# Patient Record
Sex: FEMALE | Race: WHITE | Employment: UNEMPLOYED | URBAN - METROPOLITAN AREA
[De-identification: names, ages, dates, MRNs, and addresses within clinical notes are randomized per-mention and may not be internally consistent; named-entity substitution may affect disease eponyms.]

---

## 2017-08-09 PROBLEM — M16.12 OSTEOARTHRITIS OF LEFT HIP: Chronic | Status: ACTIVE | Noted: 2017-08-09

## 2017-08-09 NOTE — H&P
[address] [facility] [address]  History and Physical Exam    Patient: Xavi Tong MRN: [id number]  SSN: [id number]    YOB: 1955  Age: 65 y.o. Sex: female      Subjective:      Chief Complaint: Left hip pain    History of Present Illness:  Patient complains of left hip pain and difficulty ambulating, which has progressed over the past several months. X-rays showed osteoarthritis of the joint. The patient's pain has persisted and progressed despite conservative treatments and therapies. The patient has been previously treated with narcotic pain medications. The patient has at this time opted for surgical intervention. Past Medical History:   Diagnosis Date    Anxiety disorder     Arthritis     Asthma     Attention deficit disorder (ADD)     Chronic pain     lumbar and cervical spine and joints    DDD (degenerative disc disease), cervical     DDD (degenerative disc disease), lumbar     Depression     major depression and anxiety, PTSD    Fatty liver disease, nonalcoholic     Fibromyalgia     GERD (gastroesophageal reflux disease)     Headache     Hypertension     Ill-defined condition     Migraine headache     Neuralgia     secondary to shingles    Osteoarthritis of left hip 8/9/2017    Positive NICOLE (antinuclear antibody)     Post-menopausal     Premature ventricular contractions     occassional    Vitamin B12 deficiency     Vitamin D deficiency      Past Surgical History:   Procedure Laterality Date    ENDOSCOPY, COLON, DIAGNOSTIC       Social History     Occupational History    Not on file.      Social History Main Topics    Smoking status: Never Smoker    Smokeless tobacco: Never Used    Alcohol use 1.8 oz/week     1 Glasses of wine, 1 Cans of beer, 1 Shots of liquor per week      Comment: No alcoholic bevearges x 1 month    Drug use: No    Sexual activity: Yes     Prior to Admission medications    Medication Sig Start Date End Date Taking? Authorizing Provider   losartan (COZAAR) 100 mg tablet Take 100 mg by mouth daily. Historical Provider   vortioxetine (TRINTELLIX) 10 mg tablet Take 30 mg by mouth daily. Historical Provider   mirtazapine (REMERON) 45 mg tablet Take 90 mg by mouth nightly. Historical Provider   morphine CR (MS CONTIN) 100 mg CR tablet Take 100 mg by mouth. [id number]    Historical Provider   methylphenidate (RITALIN) 20 mg tablet Take 20 mg by mouth four (4) times daily. Historical Provider   oxyCODONE IR (OXY-IR) 30 mg immediate release tablet Take 30 mg by mouth four (4) times daily. [id number]    Historical Provider   celecoxib (CELEBREX) 100 mg capsule Take 100 mg by mouth two (2) times daily as needed for Pain. Historical Provider   famotidine (PEPCID) 40 mg tablet Take 40 mg by mouth daily as needed. Historical Provider   eszopiclone (LUNESTA) 2 mg tablet Take 2 mg by mouth nightly as needed for Sleep. Historical Provider   albuterol sulfate (PROVENTIL;VENTOLIN) 2.5 mg/0.5 mL nebu nebulizer solution 2.5 mg by Nebulization route every four (4) hours as needed for Wheezing. Historical Provider   multivitamin (ONE A DAY) tablet Take 2 Tabs by mouth daily. Historical Provider   MAGNESIUM CITRATE PO Take 250 mg by mouth daily. Historical Provider   ascorbic acid, vitamin C, (VITAMIN C) 500 mg tablet Take 1,000 mg by mouth two (2) times a day. Historical Provider   aspirin (ASPIRIN) 325 mg tablet Take 325 mg by mouth daily. Historical Provider   lubiPROStone (AMITIZA) 24 mcg capsule Take 24 mcg by mouth two (2) times daily as needed for Constipation. Historical Provider   co-enzyme Q-10 (CO Q-10) 100 mg capsule Take 100 mg by mouth daily. Historical Provider   DULCOLAX, BISACODYL, PO Take 1 Cap by mouth daily as needed. 1-3 tablets    Historical Provider   acetaminophen (TYLENOL EXTRA STRENGTH) 500 mg tablet Take 1,000 mg by mouth every six (6) hours as needed for Pain. Historical Provider   NALOXONE HCL (NARCAN IJ) 0.4 mg by SubCUTAneous route as needed. Historical Provider   lidocaine (LIDODERM) 5 % 1 Patch by TransDERmal route every twelve (12) hours as needed. Apply patch to the affected area for 12 hours a day and remove for 12 hours a day. Historical Provider   oxyCODONE-acetaminophen (PERCOCET)  mg per tablet Take 1 Tab by mouth every six (6) hours as needed for Pain. 5/1/14   Vincent Malagon MD   estradiol (ESTRACE) 2 mg tablet Take 2 mg by mouth nightly. Historical Provider   progesterone (PROMETRIUM) 200 mg capsule Take 200 mg by mouth nightly. Historical Provider   OnabotulinumtoxinA (BOTOX) 200 unit SolR 100 Units by Injection route once as needed for 1 dose. 8/13/13   Vincent Malagon MD   HYDROcodone-ibuprofen (VICOPROFEN) 7.5-200 mg per tablet Take 1 Tab by mouth two (2) times daily as needed for Pain for 30 days. FILL ON OR AFTER:  10/31/12  Indications: PAIN 10/31/12 11/30/12  Grey Clemons MD   HYDROcodone-ibuprofen (VICOPROFEN) 7.5-200 mg per tablet Take 1 Tab by mouth four (4) times daily as needed for Pain for 10 days. 10/23/12 11/2/12  Grey Clemons MD   atenolol (TENORMIN) 100 mg tablet Take 100 mg by mouth nightly. Historical Provider   hydrochlorothiazide (HYDRODIURIL) 25 mg tablet Take 25 mg by mouth daily. Historical Provider   ALPRAZolam Jessica Inches) 0.5 mg tablet Take 1 mg by mouth four (4) times daily as needed. To 1 mg     Historical Provider   cyanocobalamin (VITAMIN B12) 1,000 mcg/mL injection 10,000 mcg by IntraMUSCular route every thirty (30) days. Historical Provider   Calcium-Cholecalciferol, D3, (CALTRATE 600 + D) 600 mg(1,500mg) -400 unit Chew Take 1 Tab by mouth daily. Historical Provider   pseudoephedrine (SUDAFED) 30 mg tablet Take 60 mg by mouth every four (4) hours as needed.       Historical Provider   albuterol (PROVENTIL HFA) 90 mcg/actuation inhaler Take 2 Puffs by inhalation every six (6) hours as needed. Historical Provider   fluticasone-salmeterol (ADVAIR DISKUS) 250-50 mcg/dose diskus inhaler Take 1 Puff by inhalation every twelve (12) hours. Historical Provider   valACYclovir (VALTREX) 500 mg tablet Take  by mouth two (2) times daily as needed. Historical Provider   oxymorphone (OPANA ER) 20 mg ER tablet Take 1 Tab by mouth every twelve (12) hours for 30 days. For chronic pain  Fill on or after:  9/5/12  Indications: CHRONIC PAIN WITH OPIOID TOLERANCE, SEVERE PAIN WITH OPIOID TOLERANCE 9/5/12 10/5/12  Allison Sullivan MD   oxymorphone (OPANA ER) 20 mg ER tablet Take 1 Tab by mouth every twelve (12) hours for 30 days. For chronic pain  Fill on or after:  01/3/12  Indications: CHRONIC PAIN WITH OPIOID TOLERANCE, SEVERE PAIN WITH OPIOID TOLERANCE 10/3/12 11/2/12  Allison Sullivan MD   rizatriptan (MAXALT-MLT) 10 mg disintegrating tablet Take 1 Tab by mouth once as needed for Migraine for 90 days. Indications: MIGRAINE 9/5/12 12/4/12  Allison Sullivan MD       Allergies: Allergies   Allergen Reactions    Other Food Rash and Itching     Black beans    Cymbalta [Duloxetine] Other (comments)     Tachycardia, fever, H/A and HTN    Egg Itching    Keflex [Cephalexin] Rash    Lisinopril Cough    Pcn [Penicillins] Hives        Review of Systems:  Pertinent items are noted in the History of Present Illness. Objective:       Physical Exam:  HEENT: Normocephalic, atraumatic  Lungs:  Clear to auscultation  Heart:   Regular rate and rhythm  Abdomen: Soft  Extremities:  Pain with range of motion of the left hip. Passive flexion  degrees,                       passive internal rotation 0-10 degrees, with pain throughout ROM,                        passive external rotation 10-20 degrees with pain at the arc of motion. Antalgic gait noted. Assessment:      Arthritis of the left hip. Plan:       Proceed with scheduled LEFT TOTAL HIP ARTHROPLASTY.     The various methods of treatment have been discussed with the patient and family. After consideration of risks, benefits, and other options for treatment, the patient has consented to surgical interventions. Questions were answered and preoperative teaching was done by Dr Mark Allred.      Signed By: MIRZA Fong     August 9, 2017

## 2017-08-10 ENCOUNTER — APPOINTMENT (OUTPATIENT)
Dept: GENERAL RADIOLOGY | Age: 62
DRG: 470 | End: 2017-08-10
Attending: ORTHOPAEDIC SURGERY
Payer: MEDICARE

## 2017-08-10 ENCOUNTER — HOSPITAL ENCOUNTER (INPATIENT)
Age: 62
LOS: 1 days | Discharge: HOME HEALTH | DRG: 470 | End: 2017-08-11
Attending: ORTHOPAEDIC SURGERY | Admitting: ORTHOPAEDIC SURGERY
Payer: MEDICARE

## 2017-08-10 ENCOUNTER — ANESTHESIA EVENT (OUTPATIENT)
Dept: SURGERY | Age: 62
DRG: 470 | End: 2017-08-10
Payer: MEDICARE

## 2017-08-10 ENCOUNTER — APPOINTMENT (OUTPATIENT)
Dept: GENERAL RADIOLOGY | Age: 62
DRG: 470 | End: 2017-08-10
Attending: PHYSICIAN ASSISTANT
Payer: MEDICARE

## 2017-08-10 ENCOUNTER — ANESTHESIA (OUTPATIENT)
Dept: SURGERY | Age: 62
DRG: 470 | End: 2017-08-10
Payer: MEDICARE

## 2017-08-10 LAB
ABO + RH BLD: NORMAL
BLOOD GROUP ANTIBODIES SERPL: NORMAL
SPECIMEN EXP DATE BLD: NORMAL

## 2017-08-10 PROCEDURE — 73501 X-RAY EXAM HIP UNI 1 VIEW: CPT

## 2017-08-10 PROCEDURE — C1776 JOINT DEVICE (IMPLANTABLE): HCPCS | Performed by: ORTHOPAEDIC SURGERY

## 2017-08-10 PROCEDURE — 74011250637 HC RX REV CODE- 250/637: Performed by: ORTHOPAEDIC SURGERY

## 2017-08-10 PROCEDURE — 77030034694 HC SCPL CANADY PLSM DISP USMD -E: Performed by: ORTHOPAEDIC SURGERY

## 2017-08-10 PROCEDURE — 74011250637 HC RX REV CODE- 250/637: Performed by: PHYSICIAN ASSISTANT

## 2017-08-10 PROCEDURE — 77030020782 HC GWN BAIR PAWS FLX 3M -B: Performed by: ORTHOPAEDIC SURGERY

## 2017-08-10 PROCEDURE — 76060000033 HC ANESTHESIA 1 TO 1.5 HR: Performed by: ORTHOPAEDIC SURGERY

## 2017-08-10 PROCEDURE — 36415 COLL VENOUS BLD VENIPUNCTURE: CPT | Performed by: PHYSICIAN ASSISTANT

## 2017-08-10 PROCEDURE — 74011250637 HC RX REV CODE- 250/637: Performed by: ANESTHESIOLOGY

## 2017-08-10 PROCEDURE — 74011250636 HC RX REV CODE- 250/636: Performed by: PHYSICIAN ASSISTANT

## 2017-08-10 PROCEDURE — 0SRB04A REPLACEMENT OF LEFT HIP JOINT WITH CERAMIC ON POLYETHYLENE SYNTHETIC SUBSTITUTE, UNCEMENTED, OPEN APPROACH: ICD-10-PCS | Performed by: ORTHOPAEDIC SURGERY

## 2017-08-10 PROCEDURE — 77030018836 HC SOL IRR NACL ICUM -A: Performed by: ORTHOPAEDIC SURGERY

## 2017-08-10 PROCEDURE — 77030034479 HC ADH SKN CLSR PRINEO J&J -B: Performed by: ORTHOPAEDIC SURGERY

## 2017-08-10 PROCEDURE — 77030013708 HC HNDPC SUC IRR PULS STRY –B: Performed by: ORTHOPAEDIC SURGERY

## 2017-08-10 PROCEDURE — 77030031139 HC SUT VCRL2 J&J -A: Performed by: ORTHOPAEDIC SURGERY

## 2017-08-10 PROCEDURE — 74011250636 HC RX REV CODE- 250/636: Performed by: ANESTHESIOLOGY

## 2017-08-10 PROCEDURE — 97162 PT EVAL MOD COMPLEX 30 MIN: CPT

## 2017-08-10 PROCEDURE — 86900 BLOOD TYPING SEROLOGIC ABO: CPT | Performed by: PHYSICIAN ASSISTANT

## 2017-08-10 PROCEDURE — 74011000250 HC RX REV CODE- 250

## 2017-08-10 PROCEDURE — 76210000017 HC OR PH I REC 1.5 TO 2 HR: Performed by: ORTHOPAEDIC SURGERY

## 2017-08-10 PROCEDURE — 77030002934 HC SUT MCRYL J&J -B: Performed by: ORTHOPAEDIC SURGERY

## 2017-08-10 PROCEDURE — 77030032490 HC SLV COMPR SCD KNE COVD -B: Performed by: ORTHOPAEDIC SURGERY

## 2017-08-10 PROCEDURE — 74011250636 HC RX REV CODE- 250/636

## 2017-08-10 PROCEDURE — 77030012508 HC MSK AIRWY LMA AMBU -A: Performed by: ANESTHESIOLOGY

## 2017-08-10 PROCEDURE — 77010033678 HC OXYGEN DAILY

## 2017-08-10 PROCEDURE — 77030016547 HC BLD SAW SAG1 STRY -B: Performed by: ORTHOPAEDIC SURGERY

## 2017-08-10 PROCEDURE — 77030003666 HC NDL SPINAL BD -A: Performed by: ORTHOPAEDIC SURGERY

## 2017-08-10 PROCEDURE — 74011000250 HC RX REV CODE- 250: Performed by: PHYSICIAN ASSISTANT

## 2017-08-10 PROCEDURE — 74011000250 HC RX REV CODE- 250: Performed by: ORTHOPAEDIC SURGERY

## 2017-08-10 PROCEDURE — 77030027138 HC INCENT SPIROMETER -A: Performed by: ORTHOPAEDIC SURGERY

## 2017-08-10 PROCEDURE — 74011000258 HC RX REV CODE- 258

## 2017-08-10 PROCEDURE — 76010000149 HC OR TIME 1 TO 1.5 HR: Performed by: ORTHOPAEDIC SURGERY

## 2017-08-10 PROCEDURE — 77030011640 HC PAD GRND REM COVD -A: Performed by: ORTHOPAEDIC SURGERY

## 2017-08-10 PROCEDURE — 74011250636 HC RX REV CODE- 250/636: Performed by: ORTHOPAEDIC SURGERY

## 2017-08-10 PROCEDURE — 97116 GAIT TRAINING THERAPY: CPT

## 2017-08-10 PROCEDURE — 65270000029 HC RM PRIVATE

## 2017-08-10 PROCEDURE — 77030033263 HC DRSG MEPILEX 16-48IN BORD MOLN -B: Performed by: ORTHOPAEDIC SURGERY

## 2017-08-10 PROCEDURE — 74011000258 HC RX REV CODE- 258: Performed by: PHYSICIAN ASSISTANT

## 2017-08-10 DEVICE — HEAD FEM DIA32MM +9MM OFFSET 12/14 TAPR HIP CERAMIC BIOLOX: Type: IMPLANTABLE DEVICE | Site: HIP | Status: FUNCTIONAL

## 2017-08-10 DEVICE — CUP ACET SECTOR GRIPTION 48MM -- TI: Type: IMPLANTABLE DEVICE | Site: HIP | Status: FUNCTIONAL

## 2017-08-10 DEVICE — COMPONENT TOT HIP PRIMARY CERM ALTRX: Type: IMPLANTABLE DEVICE | Site: HIP | Status: FUNCTIONAL

## 2017-08-10 DEVICE — LINER ACET OD48MM ID32MM NEUT OFFSET HIP ALTRX PINN: Type: IMPLANTABLE DEVICE | Site: HIP | Status: FUNCTIONAL

## 2017-08-10 RX ORDER — LIDOCAINE HYDROCHLORIDE 20 MG/ML
INJECTION, SOLUTION EPIDURAL; INFILTRATION; INTRACAUDAL; PERINEURAL AS NEEDED
Status: DISCONTINUED | OUTPATIENT
Start: 2017-08-10 | End: 2017-08-10 | Stop reason: HOSPADM

## 2017-08-10 RX ORDER — MORPHINE SULFATE 100 MG/1
100 TABLET, FILM COATED, EXTENDED RELEASE ORAL EVERY 12 HOURS
Status: DISCONTINUED | OUTPATIENT
Start: 2017-08-10 | End: 2017-08-10

## 2017-08-10 RX ORDER — ONDANSETRON 2 MG/ML
4 INJECTION INTRAMUSCULAR; INTRAVENOUS
Status: DISCONTINUED | OUTPATIENT
Start: 2017-08-10 | End: 2017-08-11 | Stop reason: HOSPADM

## 2017-08-10 RX ORDER — VANCOMYCIN HYDROCHLORIDE
1250 ONCE
Status: COMPLETED | OUTPATIENT
Start: 2017-08-10 | End: 2017-08-10

## 2017-08-10 RX ORDER — MIDAZOLAM HYDROCHLORIDE 1 MG/ML
INJECTION, SOLUTION INTRAMUSCULAR; INTRAVENOUS AS NEEDED
Status: DISCONTINUED | OUTPATIENT
Start: 2017-08-10 | End: 2017-08-10 | Stop reason: HOSPADM

## 2017-08-10 RX ORDER — SODIUM CHLORIDE 9 MG/ML
125 INJECTION, SOLUTION INTRAVENOUS CONTINUOUS
Status: DISPENSED | OUTPATIENT
Start: 2017-08-10 | End: 2017-08-11

## 2017-08-10 RX ORDER — FLUTICASONE FUROATE AND VILANTEROL 100; 25 UG/1; UG/1
1 POWDER RESPIRATORY (INHALATION) DAILY
Status: DISCONTINUED | OUTPATIENT
Start: 2017-08-11 | End: 2017-08-11 | Stop reason: HOSPADM

## 2017-08-10 RX ORDER — PREGABALIN 25 MG/1
25 CAPSULE ORAL
Status: DISCONTINUED | OUTPATIENT
Start: 2017-08-10 | End: 2017-08-10 | Stop reason: HOSPADM

## 2017-08-10 RX ORDER — MORPHINE SULFATE 100 MG/1
100 TABLET, FILM COATED, EXTENDED RELEASE ORAL EVERY 12 HOURS
Status: DISCONTINUED | OUTPATIENT
Start: 2017-08-10 | End: 2017-08-11 | Stop reason: HOSPADM

## 2017-08-10 RX ORDER — HYDROMORPHONE HYDROCHLORIDE 1 MG/ML
INJECTION, SOLUTION INTRAMUSCULAR; INTRAVENOUS; SUBCUTANEOUS AS NEEDED
Status: DISCONTINUED | OUTPATIENT
Start: 2017-08-10 | End: 2017-08-10 | Stop reason: HOSPADM

## 2017-08-10 RX ORDER — LOSARTAN POTASSIUM 50 MG/1
100 TABLET ORAL DAILY
Status: DISCONTINUED | OUTPATIENT
Start: 2017-08-11 | End: 2017-08-11 | Stop reason: HOSPADM

## 2017-08-10 RX ORDER — ONDANSETRON 2 MG/ML
4 INJECTION INTRAMUSCULAR; INTRAVENOUS ONCE
Status: DISCONTINUED | OUTPATIENT
Start: 2017-08-10 | End: 2017-08-10 | Stop reason: HOSPADM

## 2017-08-10 RX ORDER — KETAMINE HYDROCHLORIDE 10 MG/ML
INJECTION, SOLUTION INTRAMUSCULAR; INTRAVENOUS AS NEEDED
Status: DISCONTINUED | OUTPATIENT
Start: 2017-08-10 | End: 2017-08-10 | Stop reason: HOSPADM

## 2017-08-10 RX ORDER — SODIUM CHLORIDE, SODIUM LACTATE, POTASSIUM CHLORIDE, CALCIUM CHLORIDE 600; 310; 30; 20 MG/100ML; MG/100ML; MG/100ML; MG/100ML
125 INJECTION, SOLUTION INTRAVENOUS CONTINUOUS
Status: DISCONTINUED | OUTPATIENT
Start: 2017-08-10 | End: 2017-08-11 | Stop reason: HOSPADM

## 2017-08-10 RX ORDER — DOCUSATE SODIUM 100 MG/1
100 CAPSULE, LIQUID FILLED ORAL 2 TIMES DAILY
Status: DISCONTINUED | OUTPATIENT
Start: 2017-08-10 | End: 2017-08-11 | Stop reason: HOSPADM

## 2017-08-10 RX ORDER — ALPRAZOLAM 0.5 MG/1
1 TABLET ORAL
Status: DISCONTINUED | OUTPATIENT
Start: 2017-08-10 | End: 2017-08-11 | Stop reason: HOSPADM

## 2017-08-10 RX ORDER — FENTANYL CITRATE 50 UG/ML
25 INJECTION, SOLUTION INTRAMUSCULAR; INTRAVENOUS
Status: DISPENSED | OUTPATIENT
Start: 2017-08-10 | End: 2017-08-10

## 2017-08-10 RX ORDER — LANOLIN ALCOHOL/MO/W.PET/CERES
1 CREAM (GRAM) TOPICAL 3 TIMES DAILY
Status: DISCONTINUED | OUTPATIENT
Start: 2017-08-10 | End: 2017-08-11 | Stop reason: HOSPADM

## 2017-08-10 RX ORDER — HYDROCHLOROTHIAZIDE 25 MG/1
25 TABLET ORAL DAILY
Status: DISCONTINUED | OUTPATIENT
Start: 2017-08-10 | End: 2017-08-11 | Stop reason: HOSPADM

## 2017-08-10 RX ORDER — ASCORBIC ACID 250 MG
1000 TABLET ORAL 2 TIMES DAILY
Status: DISCONTINUED | OUTPATIENT
Start: 2017-08-10 | End: 2017-08-11 | Stop reason: HOSPADM

## 2017-08-10 RX ORDER — ZOLPIDEM TARTRATE 5 MG/1
5 TABLET ORAL
Status: DISCONTINUED | OUTPATIENT
Start: 2017-08-10 | End: 2017-08-11 | Stop reason: HOSPADM

## 2017-08-10 RX ORDER — SODIUM CHLORIDE 0.9 % (FLUSH) 0.9 %
5-10 SYRINGE (ML) INJECTION AS NEEDED
Status: DISCONTINUED | OUTPATIENT
Start: 2017-08-10 | End: 2017-08-10 | Stop reason: HOSPADM

## 2017-08-10 RX ORDER — ACETAMINOPHEN 10 MG/ML
1000 INJECTION, SOLUTION INTRAVENOUS ONCE
Status: COMPLETED | OUTPATIENT
Start: 2017-08-10 | End: 2017-08-10

## 2017-08-10 RX ORDER — SODIUM CHLORIDE, SODIUM LACTATE, POTASSIUM CHLORIDE, CALCIUM CHLORIDE 600; 310; 30; 20 MG/100ML; MG/100ML; MG/100ML; MG/100ML
50 INJECTION, SOLUTION INTRAVENOUS CONTINUOUS
Status: DISCONTINUED | OUTPATIENT
Start: 2017-08-10 | End: 2017-08-10 | Stop reason: HOSPADM

## 2017-08-10 RX ORDER — SODIUM CHLORIDE 0.9 % (FLUSH) 0.9 %
5-10 SYRINGE (ML) INJECTION AS NEEDED
Status: DISCONTINUED | OUTPATIENT
Start: 2017-08-10 | End: 2017-08-11 | Stop reason: HOSPADM

## 2017-08-10 RX ORDER — NALOXONE HYDROCHLORIDE 0.4 MG/ML
0.4 INJECTION, SOLUTION INTRAMUSCULAR; INTRAVENOUS; SUBCUTANEOUS AS NEEDED
Status: DISCONTINUED | OUTPATIENT
Start: 2017-08-10 | End: 2017-08-11 | Stop reason: HOSPADM

## 2017-08-10 RX ORDER — FAMOTIDINE 20 MG/1
40 TABLET, FILM COATED ORAL
Status: DISCONTINUED | OUTPATIENT
Start: 2017-08-10 | End: 2017-08-11 | Stop reason: HOSPADM

## 2017-08-10 RX ORDER — HYDROMORPHONE HYDROCHLORIDE 1 MG/ML
0.5 INJECTION, SOLUTION INTRAMUSCULAR; INTRAVENOUS; SUBCUTANEOUS
Status: COMPLETED | OUTPATIENT
Start: 2017-08-10 | End: 2017-08-10

## 2017-08-10 RX ORDER — ASPIRIN 325 MG
325 TABLET ORAL 2 TIMES DAILY
Qty: 42 TAB | Refills: 0 | Status: SHIPPED | OUTPATIENT
Start: 2017-08-10 | End: 2017-08-31

## 2017-08-10 RX ORDER — PANTOPRAZOLE SODIUM 40 MG/1
40 TABLET, DELAYED RELEASE ORAL DAILY
Status: DISCONTINUED | OUTPATIENT
Start: 2017-08-10 | End: 2017-08-11 | Stop reason: HOSPADM

## 2017-08-10 RX ORDER — DIPHENHYDRAMINE HYDROCHLORIDE 50 MG/ML
12.5 INJECTION, SOLUTION INTRAMUSCULAR; INTRAVENOUS
Status: DISCONTINUED | OUTPATIENT
Start: 2017-08-10 | End: 2017-08-11 | Stop reason: HOSPADM

## 2017-08-10 RX ORDER — ACETAMINOPHEN 10 MG/ML
1000 INJECTION, SOLUTION INTRAVENOUS EVERY 6 HOURS
Status: DISPENSED | OUTPATIENT
Start: 2017-08-10 | End: 2017-08-11

## 2017-08-10 RX ORDER — ASPIRIN 325 MG/1
325 TABLET, FILM COATED ORAL
Status: DISCONTINUED | OUTPATIENT
Start: 2017-08-10 | End: 2017-08-11 | Stop reason: HOSPADM

## 2017-08-10 RX ORDER — MELOXICAM 7.5 MG/1
7.5 TABLET ORAL 2 TIMES DAILY
Status: DISCONTINUED | OUTPATIENT
Start: 2017-08-10 | End: 2017-08-11 | Stop reason: HOSPADM

## 2017-08-10 RX ORDER — SODIUM CHLORIDE 9 MG/ML
300 INJECTION, SOLUTION INTRAVENOUS CONTINUOUS
Status: DISPENSED | OUTPATIENT
Start: 2017-08-10 | End: 2017-08-10

## 2017-08-10 RX ORDER — ATENOLOL 50 MG/1
100 TABLET ORAL
Status: DISCONTINUED | OUTPATIENT
Start: 2017-08-10 | End: 2017-08-11 | Stop reason: HOSPADM

## 2017-08-10 RX ORDER — VANCOMYCIN HYDROCHLORIDE
1250 EVERY 12 HOURS
Status: COMPLETED | OUTPATIENT
Start: 2017-08-10 | End: 2017-08-11

## 2017-08-10 RX ORDER — MELOXICAM 7.5 MG/1
7.5 TABLET ORAL 2 TIMES DAILY
Qty: 28 TAB | Refills: 0 | Status: SHIPPED | OUTPATIENT
Start: 2017-08-10 | End: 2017-08-24

## 2017-08-10 RX ORDER — OXYCODONE HYDROCHLORIDE 5 MG/1
5 TABLET ORAL ONCE
Status: COMPLETED | OUTPATIENT
Start: 2017-08-10 | End: 2017-08-10

## 2017-08-10 RX ORDER — DIPHENHYDRAMINE HCL 25 MG
25 CAPSULE ORAL
Status: DISCONTINUED | OUTPATIENT
Start: 2017-08-10 | End: 2017-08-11 | Stop reason: HOSPADM

## 2017-08-10 RX ORDER — INSULIN LISPRO 100 [IU]/ML
INJECTION, SOLUTION INTRAVENOUS; SUBCUTANEOUS ONCE
Status: DISCONTINUED | OUTPATIENT
Start: 2017-08-10 | End: 2017-08-10 | Stop reason: HOSPADM

## 2017-08-10 RX ORDER — ONDANSETRON 2 MG/ML
INJECTION INTRAMUSCULAR; INTRAVENOUS AS NEEDED
Status: DISCONTINUED | OUTPATIENT
Start: 2017-08-10 | End: 2017-08-10 | Stop reason: HOSPADM

## 2017-08-10 RX ORDER — FENTANYL CITRATE 50 UG/ML
INJECTION, SOLUTION INTRAMUSCULAR; INTRAVENOUS AS NEEDED
Status: DISCONTINUED | OUTPATIENT
Start: 2017-08-10 | End: 2017-08-10 | Stop reason: HOSPADM

## 2017-08-10 RX ORDER — FERROUS SULFATE, DRIED 160(50) MG
1 TABLET, EXTENDED RELEASE ORAL
Status: DISCONTINUED | OUTPATIENT
Start: 2017-08-11 | End: 2017-08-11 | Stop reason: HOSPADM

## 2017-08-10 RX ORDER — MIRTAZAPINE 15 MG/1
90 TABLET, FILM COATED ORAL
Status: CANCELLED | OUTPATIENT
Start: 2017-08-10

## 2017-08-10 RX ORDER — OXYCODONE HYDROCHLORIDE 5 MG/1
30 TABLET ORAL 4 TIMES DAILY
Status: DISCONTINUED | OUTPATIENT
Start: 2017-08-10 | End: 2017-08-11 | Stop reason: HOSPADM

## 2017-08-10 RX ORDER — NALOXONE HYDROCHLORIDE 0.4 MG/ML
0.1 INJECTION, SOLUTION INTRAMUSCULAR; INTRAVENOUS; SUBCUTANEOUS
Status: DISCONTINUED | OUTPATIENT
Start: 2017-08-10 | End: 2017-08-10 | Stop reason: HOSPADM

## 2017-08-10 RX ORDER — METHYLPHENIDATE HYDROCHLORIDE 10 MG/1
20 TABLET ORAL 4 TIMES DAILY
Status: CANCELLED | OUTPATIENT
Start: 2017-08-10

## 2017-08-10 RX ORDER — ALBUTEROL SULFATE 90 UG/1
2 AEROSOL, METERED RESPIRATORY (INHALATION)
Status: DISCONTINUED | OUTPATIENT
Start: 2017-08-10 | End: 2017-08-11 | Stop reason: HOSPADM

## 2017-08-10 RX ORDER — SODIUM CHLORIDE 0.9 % (FLUSH) 0.9 %
5-10 SYRINGE (ML) INJECTION EVERY 8 HOURS
Status: DISCONTINUED | OUTPATIENT
Start: 2017-08-10 | End: 2017-08-11 | Stop reason: HOSPADM

## 2017-08-10 RX ORDER — PROPOFOL 10 MG/ML
INJECTION, EMULSION INTRAVENOUS AS NEEDED
Status: DISCONTINUED | OUTPATIENT
Start: 2017-08-10 | End: 2017-08-10 | Stop reason: HOSPADM

## 2017-08-10 RX ORDER — MAGNESIUM SULFATE 100 %
4 CRYSTALS MISCELLANEOUS AS NEEDED
Status: DISCONTINUED | OUTPATIENT
Start: 2017-08-10 | End: 2017-08-10 | Stop reason: HOSPADM

## 2017-08-10 RX ORDER — OXYCODONE AND ACETAMINOPHEN 5; 325 MG/1; MG/1
1 TABLET ORAL AS NEEDED
Status: DISCONTINUED | OUTPATIENT
Start: 2017-08-10 | End: 2017-08-10 | Stop reason: HOSPADM

## 2017-08-10 RX ORDER — METOCLOPRAMIDE HYDROCHLORIDE 5 MG/ML
10 INJECTION INTRAMUSCULAR; INTRAVENOUS
Status: DISCONTINUED | OUTPATIENT
Start: 2017-08-10 | End: 2017-08-11 | Stop reason: HOSPADM

## 2017-08-10 RX ORDER — DEXAMETHASONE SODIUM PHOSPHATE 4 MG/ML
INJECTION, SOLUTION INTRA-ARTICULAR; INTRALESIONAL; INTRAMUSCULAR; INTRAVENOUS; SOFT TISSUE AS NEEDED
Status: DISCONTINUED | OUTPATIENT
Start: 2017-08-10 | End: 2017-08-10 | Stop reason: HOSPADM

## 2017-08-10 RX ORDER — BISACODYL 5 MG
5 TABLET, DELAYED RELEASE (ENTERIC COATED) ORAL DAILY PRN
Status: DISCONTINUED | OUTPATIENT
Start: 2017-08-10 | End: 2017-08-11 | Stop reason: HOSPADM

## 2017-08-10 RX ORDER — DEXTROSE 50 % IN WATER (D50W) INTRAVENOUS SYRINGE
25-50 AS NEEDED
Status: DISCONTINUED | OUTPATIENT
Start: 2017-08-10 | End: 2017-08-10 | Stop reason: HOSPADM

## 2017-08-10 RX ORDER — CELECOXIB 100 MG/1
400 CAPSULE ORAL
Status: COMPLETED | OUTPATIENT
Start: 2017-08-10 | End: 2017-08-10

## 2017-08-10 RX ORDER — KETOROLAC TROMETHAMINE 15 MG/ML
15 INJECTION, SOLUTION INTRAMUSCULAR; INTRAVENOUS EVERY 6 HOURS
Status: DISCONTINUED | OUTPATIENT
Start: 2017-08-10 | End: 2017-08-11

## 2017-08-10 RX ADMIN — SODIUM CHLORIDE 1 G: 900 INJECTION, SOLUTION INTRAVENOUS at 07:41

## 2017-08-10 RX ADMIN — ACETAMINOPHEN 1000 MG: 10 INJECTION, SOLUTION INTRAVENOUS at 13:10

## 2017-08-10 RX ADMIN — MORPHINE SULFATE 100 MG: 100 TABLET, EXTENDED RELEASE ORAL at 20:27

## 2017-08-10 RX ADMIN — OXYCODONE HYDROCHLORIDE 5 MG: 5 TABLET ORAL at 06:52

## 2017-08-10 RX ADMIN — FERROUS SULFATE TAB 325 MG (65 MG ELEMENTAL FE) 325 MG: 325 (65 FE) TAB at 17:53

## 2017-08-10 RX ADMIN — FENTANYL CITRATE 25 MCG: 50 INJECTION, SOLUTION INTRAMUSCULAR; INTRAVENOUS at 09:26

## 2017-08-10 RX ADMIN — FENTANYL CITRATE 25 MCG: 50 INJECTION, SOLUTION INTRAMUSCULAR; INTRAVENOUS at 09:39

## 2017-08-10 RX ADMIN — PANTOPRAZOLE SODIUM 40 MG: 40 TABLET, DELAYED RELEASE ORAL at 06:52

## 2017-08-10 RX ADMIN — FENTANYL CITRATE 25 MCG: 50 INJECTION, SOLUTION INTRAMUSCULAR; INTRAVENOUS at 09:31

## 2017-08-10 RX ADMIN — KETOROLAC TROMETHAMINE 15 MG: 15 INJECTION, SOLUTION INTRAMUSCULAR; INTRAVENOUS at 11:20

## 2017-08-10 RX ADMIN — HYDROMORPHONE HYDROCHLORIDE 1 MG: 1 INJECTION, SOLUTION INTRAMUSCULAR; INTRAVENOUS; SUBCUTANEOUS at 08:30

## 2017-08-10 RX ADMIN — PROPOFOL 200 MG: 10 INJECTION, EMULSION INTRAVENOUS at 07:27

## 2017-08-10 RX ADMIN — FENTANYL CITRATE 50 MCG: 50 INJECTION, SOLUTION INTRAMUSCULAR; INTRAVENOUS at 07:25

## 2017-08-10 RX ADMIN — FENTANYL CITRATE 50 MCG: 50 INJECTION, SOLUTION INTRAMUSCULAR; INTRAVENOUS at 07:27

## 2017-08-10 RX ADMIN — KETAMINE HYDROCHLORIDE 10 MG: 10 INJECTION, SOLUTION INTRAMUSCULAR; INTRAVENOUS at 07:30

## 2017-08-10 RX ADMIN — OXYCODONE HYDROCHLORIDE 30 MG: 5 TABLET ORAL at 21:50

## 2017-08-10 RX ADMIN — VANCOMYCIN HYDROCHLORIDE 1.25 G: 10 INJECTION, POWDER, LYOPHILIZED, FOR SOLUTION INTRAVENOUS at 07:25

## 2017-08-10 RX ADMIN — PROPOFOL 50 MG: 10 INJECTION, EMULSION INTRAVENOUS at 08:10

## 2017-08-10 RX ADMIN — Medication 1000 MG: at 11:20

## 2017-08-10 RX ADMIN — HYDROMORPHONE HYDROCHLORIDE 0.5 MG: 1 INJECTION, SOLUTION INTRAMUSCULAR; INTRAVENOUS; SUBCUTANEOUS at 10:14

## 2017-08-10 RX ADMIN — KETOROLAC TROMETHAMINE 15 MG: 15 INJECTION, SOLUTION INTRAMUSCULAR; INTRAVENOUS at 17:53

## 2017-08-10 RX ADMIN — HYDROMORPHONE HYDROCHLORIDE 0.5 MG: 1 INJECTION, SOLUTION INTRAMUSCULAR; INTRAVENOUS; SUBCUTANEOUS at 09:55

## 2017-08-10 RX ADMIN — ATENOLOL 100 MG: 50 TABLET ORAL at 21:50

## 2017-08-10 RX ADMIN — DOCUSATE SODIUM 100 MG: 100 CAPSULE, LIQUID FILLED ORAL at 20:27

## 2017-08-10 RX ADMIN — FENTANYL CITRATE 25 MCG: 50 INJECTION, SOLUTION INTRAMUSCULAR; INTRAVENOUS at 09:16

## 2017-08-10 RX ADMIN — HYDROMORPHONE HYDROCHLORIDE 0.5 MG: 1 INJECTION, SOLUTION INTRAMUSCULAR; INTRAVENOUS; SUBCUTANEOUS at 09:45

## 2017-08-10 RX ADMIN — ONDANSETRON 4 MG: 2 INJECTION INTRAMUSCULAR; INTRAVENOUS at 07:40

## 2017-08-10 RX ADMIN — SODIUM CHLORIDE, SODIUM LACTATE, POTASSIUM CHLORIDE, AND CALCIUM CHLORIDE 1000 ML: 600; 310; 30; 20 INJECTION, SOLUTION INTRAVENOUS at 06:49

## 2017-08-10 RX ADMIN — KETAMINE HYDROCHLORIDE 20 MG: 10 INJECTION, SOLUTION INTRAMUSCULAR; INTRAVENOUS at 07:25

## 2017-08-10 RX ADMIN — HYDROMORPHONE HYDROCHLORIDE 0.5 MG: 1 INJECTION, SOLUTION INTRAMUSCULAR; INTRAVENOUS; SUBCUTANEOUS at 10:25

## 2017-08-10 RX ADMIN — ALPRAZOLAM 1 MG: 0.5 TABLET ORAL at 20:33

## 2017-08-10 RX ADMIN — CELECOXIB 400 MG: 100 CAPSULE ORAL at 06:52

## 2017-08-10 RX ADMIN — MELOXICAM 7.5 MG: 7.5 TABLET ORAL at 20:27

## 2017-08-10 RX ADMIN — OXYCODONE HYDROCHLORIDE 30 MG: 5 TABLET ORAL at 13:10

## 2017-08-10 RX ADMIN — KETOROLAC TROMETHAMINE 15 MG: 15 INJECTION, SOLUTION INTRAMUSCULAR; INTRAVENOUS at 23:48

## 2017-08-10 RX ADMIN — MIDAZOLAM HYDROCHLORIDE 2 MG: 1 INJECTION, SOLUTION INTRAMUSCULAR; INTRAVENOUS at 07:25

## 2017-08-10 RX ADMIN — SODIUM CHLORIDE 1 G: 900 INJECTION, SOLUTION INTRAVENOUS at 08:27

## 2017-08-10 RX ADMIN — VANCOMYCIN HYDROCHLORIDE 1250 MG: 10 INJECTION, POWDER, LYOPHILIZED, FOR SOLUTION INTRAVENOUS at 17:58

## 2017-08-10 RX ADMIN — FERROUS SULFATE TAB 325 MG (65 MG ELEMENTAL FE) 325 MG: 325 (65 FE) TAB at 21:50

## 2017-08-10 RX ADMIN — SODIUM CHLORIDE 125 ML/HR: 900 INJECTION, SOLUTION INTRAVENOUS at 15:00

## 2017-08-10 RX ADMIN — OXYCODONE HYDROCHLORIDE 30 MG: 5 TABLET ORAL at 17:53

## 2017-08-10 RX ADMIN — DOCUSATE SODIUM 100 MG: 100 CAPSULE, LIQUID FILLED ORAL at 11:20

## 2017-08-10 RX ADMIN — ACETAMINOPHEN 1000 MG: 10 INJECTION, SOLUTION INTRAVENOUS at 07:34

## 2017-08-10 RX ADMIN — PROPOFOL 50 MG: 10 INJECTION, EMULSION INTRAVENOUS at 08:05

## 2017-08-10 RX ADMIN — VANCOMYCIN HYDROCHLORIDE 1250 MG: 10 INJECTION, POWDER, LYOPHILIZED, FOR SOLUTION INTRAVENOUS at 06:59

## 2017-08-10 RX ADMIN — Medication 1000 MG: at 20:27

## 2017-08-10 RX ADMIN — LIDOCAINE HYDROCHLORIDE 20 MG: 20 INJECTION, SOLUTION EPIDURAL; INFILTRATION; INTRACAUDAL; PERINEURAL at 07:27

## 2017-08-10 RX ADMIN — KETAMINE HYDROCHLORIDE 20 MG: 10 INJECTION, SOLUTION INTRAMUSCULAR; INTRAVENOUS at 08:00

## 2017-08-10 RX ADMIN — ASPIRIN 325 MG: 325 TABLET, FILM COATED ORAL at 17:53

## 2017-08-10 RX ADMIN — SODIUM CHLORIDE 300 ML/HR: 900 INJECTION, SOLUTION INTRAVENOUS at 11:22

## 2017-08-10 RX ADMIN — DEXAMETHASONE SODIUM PHOSPHATE 4 MG: 4 INJECTION, SOLUTION INTRA-ARTICULAR; INTRALESIONAL; INTRAMUSCULAR; INTRAVENOUS; SOFT TISSUE at 07:40

## 2017-08-10 RX ADMIN — SODIUM CHLORIDE, SODIUM LACTATE, POTASSIUM CHLORIDE, AND CALCIUM CHLORIDE 125 ML/HR: 600; 310; 30; 20 INJECTION, SOLUTION INTRAVENOUS at 06:49

## 2017-08-10 NOTE — INTERVAL H&P NOTE
H&P Update:  Alejandra Tay was seen and examined. History and physical has been reviewed. The patient has been examined.  There have been no significant clinical changes since the completion of the originally dated History and Physical.    Signed By: Wanda Carrington MD     August 10, 2017 6:58 AM

## 2017-08-10 NOTE — ANESTHESIA PREPROCEDURE EVALUATION
Anesthetic History   No history of anesthetic complications            Review of Systems / Medical History  Patient summary reviewed, nursing notes reviewed and pertinent labs reviewed    Pulmonary            Asthma : well controlled       Neuro/Psych   Within defined limits           Cardiovascular    Hypertension        Dysrhythmias            GI/Hepatic/Renal     GERD: well controlled           Endo/Other  Within defined limits           Other Findings              Physical Exam    Airway  Mallampati: II  TM Distance: 4 - 6 cm  Neck ROM: normal range of motion   Mouth opening: Normal     Cardiovascular  Regular rate and rhythm,  S1 and S2 normal,  no murmur, click, rub, or gallop             Dental  No notable dental hx       Pulmonary  Breath sounds clear to auscultation               Abdominal  Abdominal exam normal       Other Findings            Anesthetic Plan    ASA: 3  Anesthesia type: general          Induction: Intravenous  Anesthetic plan and risks discussed with: Family and Patient

## 2017-08-10 NOTE — ANESTHESIA POSTPROCEDURE EVALUATION
Post-Anesthesia Evaluation and Assessment    Cardiovascular Function/Vital Signs  Visit Vitals    /77 (BP 1 Location: Left arm, BP Patient Position: At rest)    Pulse 77    Temp 36.3 °C (97.4 °F)    Resp 13    Ht 5' 3\" (1.6 m)    Wt 85 kg (187 lb 6 oz)    SpO2 100%    BMI 33.19 kg/m2       Patient is status post Procedure(s):  LEFT TOTAL HIP REPLACEMENT - ANTERIOR APPROACH W/C-ARM - SPEC POP. Nausea/Vomiting: Controlled. Postoperative hydration reviewed and adequate. Pain:  Pain Scale 1: Numeric (0 - 10) (08/10/17 1030)  Pain Intensity 1: 5 (08/10/17 1030)   Managed. Neurological Status:   Neuro (WDL): Within Defined Limits (08/10/17 0908)   At baseline. Mental Status and Level of Consciousness: Baseline and stable. Pulmonary Status:   O2 Device: Nasal cannula (08/10/17 1030)   Adequate oxygenation and airway patent. Complications related to anesthesia: None    Post-anesthesia assessment completed. No concerns. Patient has met all discharge requirements.     Signed By: Maikol Menendez MD

## 2017-08-10 NOTE — ROUTINE PROCESS
TRANSFER - IN REPORT:    Verbal report received from Shahnaz Naidu RN (name) on Sophia Mcmahan  being received from PACU (unit) for routine progression of care      Report consisted of patients Situation, Background, Assessment and   Recommendations(SBAR). Information from the following report(s) SBAR, Kardex, OR Summary, Procedure Summary, Intake/Output, MAR, Accordion, Recent Results and Med Rec Status was reviewed with the receiving nurse. Opportunity for questions and clarification was provided. Assessment to be completed upon patients arrival to unit and care assumed.

## 2017-08-10 NOTE — PERIOP NOTES
TRANSFER - OUT REPORT:    Verbal report given to Melquiades Luther RN on Joshua Sher  being transferred to [address] [place] [address] ([facility] for routine post - op       Report consisted of patients Situation, Background, Assessment and   Recommendations(SBAR). Information from the following report(s) SBAR, Intake/Output and MAR was reviewed with the receiving nurse. Lines:   Peripheral IV 08/10/17 Left Hand (Active)   Site Assessment Clean, dry, & intact 8/10/2017  9:26 AM   Phlebitis Assessment 0 8/10/2017  9:26 AM   Infiltration Assessment 0 8/10/2017  9:26 AM   Dressing Status Clean, dry, & intact 8/10/2017  9:26 AM   Dressing Type Tape 8/10/2017  9:26 AM   Hub Color/Line Status Infusing 8/10/2017  9:26 AM        Opportunity for questions and clarification was provided.       Patient transported with:   Registered Nurse

## 2017-08-10 NOTE — PROGRESS NOTES
Problem: Mobility Impaired (Adult and Pediatric)  Goal: *Acute Goals and Plan of Care (Insert Text)  In 1-7 days pt will be able to perform:  ST. Bed mobility: Rolling L to R to L modified independent for positioning. 2. Supine to sit to supine S with HR for meals. 3. Sit to stand to sit S with RW in prep for ambulation. LT. Gait: Ambulate >150ft S with RW, WBAT, for home/community mobility. 2. Stair Negotiation: Ascend/descend >2 steps CGA with HR for home entry. 3. Activity tolerance: Tolerate up in chair 1-2 hours for ADLs. 4. Patient/Family Education: Patient/family to be independent with HEP for follow-up care and safe discharge. PHYSICAL THERAPY EVALUATION     Patient: Iris Hopkins (38 y.o. female)  Date: 8/10/2017  Primary Diagnosis: OSTEOARTHRITIS LEFT HIP  Osteoarthritis of left hip  Procedure(s) (LRB):  LEFT TOTAL HIP REPLACEMENT - ANTERIOR APPROACH W/C-ARM - SPEC POP (Left) Day of Surgery   Precautions:   Fall, WBAT      ASSESSMENT :  Based on the objective data described below, the patient presents with decreased functional mobility and independence in regard to bed mobility, transfers, gt quality and tolerance, L hip strength, pain, stair negotiation and safety due to recent L NANCY surgery. Pt rating pain L hip 7/10 on numerical pain scale. Pt requesting to use bathroom. Pt required min A for supine<>sit<>stand. Pt had urinary incontinence and required bed linen change. Pt able to participate in gt training w/ RW, WBAT, GB and Nathaly w/ antalgic pattern. Pt able to void on [facility] and perform own hygiene. Pt returned to supine in bed w/ all needs within reach, SCDs applied and ice pack to L hip. Nurse Godwin rodriguez. [facility]. Pt will need RW for personal use. Patient will benefit from skilled intervention to address the above impairments.   Patients rehabilitation potential is considered to be Good  Factors which may influence rehabilitation potential include:   [ ]         None noted  [ ]         Mental ability/status  [ ]         Medical condition  [ ]         Home/family situation and support systems  [ ]         Safety awareness  [X]         Pain tolerance/management  [ ]         Other:        PLAN :  Recommendations and Planned Interventions:  [X]           Bed Mobility Training             [ ]    Neuromuscular Re-Education  [X]           Transfer Training                   [ ]    Orthotic/Prosthetic Training  [X]           Gait Training                          [ ]    Modalities  [X]           Therapeutic Exercises          [ ]    Edema Management/Control  [X]           Therapeutic Activities            [X]    Patient and Family Training/Education  [ ]           Other (comment):     Frequency/Duration: Patient will be followed by physical therapy twice daily to address goals. Discharge Recommendations: Home Health  Further Equipment Recommendations for Discharge: rolling walker       SUBJECTIVE:   Patient stated I need to go to the bathroom.       OBJECTIVE DATA SUMMARY:       Past Medical History:   Diagnosis Date    Anxiety disorder      Arthritis      Asthma      Attention deficit disorder (ADD)      Chronic pain       lumbar and cervical spine and joints    DDD (degenerative disc disease), cervical      DDD (degenerative disc disease), lumbar      Depression       major depression and anxiety, PTSD    Fatty liver disease, nonalcoholic      Fibromyalgia      GERD (gastroesophageal reflux disease)      Headache      Hypertension      Ill-defined condition      Migraine headache      Neuralgia       secondary to shingles    Osteoarthritis of left hip 8/9/2017    Positive NICOLE (antinuclear antibody)      Post-menopausal      Premature ventricular contractions       occassional    Vitamin B12 deficiency      Vitamin D deficiency       Past Surgical History:   Procedure Laterality Date    ENDOSCOPY, COLON, DIAGNOSTIC         Barriers to Learning/Limitations: None  Compensate with: visual, verbal, tactile, kinesthetic cues/model  Prior Level of Function/Home Situation:   Home Situation  Home Environment: Private residence  # Steps to Enter: 5  Rails to Enter: Yes  Hand Rails : Right  One/Two Story Residence: Two story, live on 1st floor  Living Alone: No  Support Systems: Spouse/Significant Other/Partner  Patient Expects to be Discharged to[de-identified] Private residence  Current DME Used/Available at Home: Rhoda Jointer, straight  Critical Behavior:  Neurologic State: Drowsy  Orientation Level: Oriented to person;Oriented to place;Oriented to situation  Cognition: Appropriate decision making; Appropriate for age attention/concentration; Appropriate safety awareness; Follows commands  Psychosocial  Patient Behaviors: Calm; Cooperative  Purposeful Interaction: Yes  Pt Identified Daily Priority: Clinical issues (comment)  Caring Interventions: Reassure  Reassure: Story tellings  Skin Integrity: Incision (comment) (L hip)  Skin Integumentary  Skin Integrity: Incision (comment) (L hip)  Strength:    Strength: Generally decreased, functional  Tone & Sensation:   Tone: Normal  Sensation: Intact  Range Of Motion:  AROM: Generally decreased, functional  Functional Mobility:  Bed Mobility:  Supine to Sit: Minimum assistance; Additional time (vc)  Sit to Supine: Minimum assistance; Additional time (vc)  Scooting: Minimum assistance; Additional time (vc)  Transfers:  Sit to Stand: Minimum assistance (vc)  Stand to Sit: Minimum assistance (vc)  Balance:   Sitting: Intact  Standing: With support; Impaired  Standing - Static: Fair;Constant support  Standing - Dynamic : Fair  Ambulation/Gait Training:  Distance (ft): 10 Feet (ft)  Assistive Device: Walker, rolling;Gait belt  Ambulation - Level of Assistance: Minimal assistance (vc)  Gait Abnormalities: Antalgic;Decreased step clearance; Step to gait  Left Side Weight Bearing: As tolerated  Base of Support: Shift to right  Stance: Left decreased  Speed/Vika: Slow  Step Length: Left shortened;Right shortened  Swing Pattern: Left asymmetrical;Right asymmetrical  Interventions: Safety awareness training;Verbal cues  Therapeutic Exercises:   HEP written copy issued to pt per MD protocol. Pain:  Pain Scale 1: Numeric (0 - 10)  Pain Intensity 1: 7  Pain Location 1: Hip  Pain Orientation 1: Left  Pain Description 1: Aching  Pain Intervention(s) 1: Ice  Activity Tolerance:   Fair   Please refer to the flowsheet for vital signs taken during this treatment. After treatment:   [ ]         Patient left in no apparent distress sitting up in chair  [X]         Patient left in no apparent distress in bed  [X]         Call bell left within reach  [X]         Nursing notified  [ ]         Caregiver present  [ ]         Bed alarm activated      COMMUNICATION/EDUCATION:   [X]         Fall prevention education was provided and the patient/caregiver indicated understanding. [X]         Patient/family have participated as able in goal setting and plan of care. [X]         Patient/family agree to work toward stated goals and plan of care. [ ]         Patient understands intent and goals of therapy, but is neutral about his/her participation. [ ]         Patient is unable to participate in goal setting and plan of care.      Thank you for this referral.  Chrystie Mix, PT   Time Calculation: 25 mins  Eval Complexity: History: HIGH Complexity :3+ comorbidities / personal factors will impact the outcome/ POC Exam:MEDIUM Complexity : 3 Standardized tests and measures addressing body structure, function, activity limitation and / or participation in recreation  Presentation: MEDIUM Complexity : Evolving with changing characteristics  Clinical Decision Making:Medium Complexity amb <30' Overall Complexity:MEDIUM

## 2017-08-10 NOTE — IP AVS SNAPSHOT
[address] [place] [id number] 
[phone] Patient: Alexandre Gamez MRN: [id number] RNE:[birth year] You are allergic to the following Allergen Reactions Other Food Rash Itching Black beans Cymbalta (Duloxetine) Other (comments) Tachycardia, fever, H/A and HTN Keflex (Cephalexin) Rash Lisinopril Cough Pcn (Penicillins) Hives Recent Documentation Height Weight BMI OB Status Smoking Status 1.6 m 85 kg 33.19 kg/m2 Postmenopausal Never Smoker Emergency Contacts Name Discharge Info Relation Home Work Mobile Noel,Perico DECLINED CAREGIVER [4] Spouse [3] [phone] About your hospitalization You were admitted on:  August 10, 2017 You last received care in the:  [facility] [address] You were discharged on:  August 11, 2017 Unit phone number:  [phone] Why you were hospitalized Your primary diagnosis was:  Osteoarthritis Of Left Hip Providers Seen During Your Hospitalizations Provider Role Specialty Primary office phone Cam Macdonald MD Attending Provider Orthopedic Surgery [phone] Your Primary Care Physician (PCP) Primary Care Physician Office Phone Office Fax Ltio Leone [phone] Follow-up Information Follow up With Details Comments Contact Info Cam Macdonald MD On 8/25/2017 Follow up appointment @ 11:15am [address] [place] [address] [address] [facility] 150 
[phone] Francisca Anderson MD   [address] [address] [facility] 83 [id number] [phone] [address] [place] [address] to continue managing your healthcare needs [facility] [phone] Current Discharge Medication List  
  
START taking these medications Dose & Instructions Dispensing Information Comments Morning Noon Evening Bedtime  
 meloxicam 7.5 mg tablet Commonly known as:  MOBIC Your last dose was: Your next dose is:    
   
   
 Dose:  7.5 mg Take 1 Tab by mouth two (2) times a day for 14 days. Quantity:  28 Tab Refills:  0 CONTINUE these medications which have CHANGED Dose & Instructions Dispensing Information Comments Morning Noon Evening Bedtime  
 aspirin 325 mg tablet Commonly known as:  ASPIRIN What changed:  when to take this Your last dose was: Your next dose is:    
   
   
 Dose:  325 mg Take 1 Tab by mouth two (2) times a day for 21 days. Quantity:  42 Tab Refills:  0 CONTINUE these medications which have NOT CHANGED Dose & Instructions Dispensing Information Comments Morning Noon Evening Bedtime ADVAIR DISKUS 250-50 mcg/dose diskus inhaler Generic drug:  fluticasone-salmeterol Your last dose was: Your next dose is:    
   
   
 Dose:  1 Puff Take 1 Puff by inhalation every twelve (12) hours. Refills:  0 AMITIZA 24 mcg capsule Generic drug:  lubiPROStone Your last dose was: Your next dose is:    
   
   
 Dose:  24 mcg Take 24 mcg by mouth two (2) times daily as needed for Constipation. Refills:  0  
     
   
   
   
  
 CALTRATE 600 + D 600 mg(1,500mg) -400 unit Chew Generic drug:  Calcium-Cholecalciferol (D3) Your last dose was: Your next dose is:    
   
   
 Dose:  1 Tab Take 1 Tab by mouth daily. Refills:  0  
     
   
   
   
  
 cyanocobalamin 1,000 mcg/mL injection Commonly known as:  VITAMIN B12 Your last dose was: Your next dose is:    
   
   
 Dose:  95541 mcg  
10,000 mcg by IntraMUSCular route every thirty (30) days. Refills:  0 DULCOLAX (BISACODYL) PO Your last dose was: Your next dose is:    
   
   
 Dose:  1 Cap Take 1 Cap by mouth daily as needed. 1-3 tablets Refills:  0  
     
   
   
   
  
 hydroCHLOROthiazide 25 mg tablet Commonly known as:  HYDRODIURIL Your last dose was: Your next dose is:    
   
   
 Dose:  25 mg Take 25 mg by mouth daily. Refills:  0  
     
   
   
   
  
 losartan 100 mg tablet Commonly known as:  COZAAR Your last dose was: Your next dose is:    
   
   
 Dose:  100 mg Take 100 mg by mouth daily. Refills:  0 LUNESTA 2 mg tablet Generic drug:  eszopiclone Your last dose was: Your next dose is:    
   
   
 Dose:  2 mg Take 2 mg by mouth nightly as needed for Sleep. Refills:  0 MAGNESIUM CITRATE PO Your last dose was: Your next dose is:    
   
   
 Dose:  250 mg Take 250 mg by mouth daily. Refills:  0  
     
   
   
   
  
 MS CONTIN 100 mg CR tablet Generic drug:  morphine CR Your last dose was: Your next dose is:    
   
   
 Dose:  100 mg Take 100 mg by mouth. [id number] Refills:  0  
     
   
   
   
  
 multivitamin tablet Commonly known as:  ONE A DAY Your last dose was: Your next dose is:    
   
   
 Dose:  2 Tab Take 2 Tabs by mouth daily. Refills:  0 Colleen Man Your last dose was: Your next dose is:    
   
   
 Dose:  0.4 mg  
0.4 mg by SubCUTAneous route as needed. Refills:  0  
     
   
   
   
  
 onabotulinumtoxinA 200 unit injection Commonly known as:  BOTOX Your last dose was: Your next dose is:    
   
   
 Dose:  100 Units 100 Units by Injection route once as needed for 1 dose. Quantity:  1 Each Refills:  0  
     
   
   
   
  
 oxyCODONE IR 30 mg immediate release tablet Commonly known as:  OXY-IR Your last dose was:     
   
Your next dose is:    
   
   
 Dose:  30 mg  
 Take 30 mg by mouth four (4) times daily. [facility] Refills:  0 PEPCID 40 mg tablet Generic drug:  famotidine Your last dose was: Your next dose is:    
   
   
 Dose:  40 mg Take 40 mg by mouth daily as needed. Refills:  0  
     
   
   
   
  
 * PROVENTIL HFA 90 mcg/actuation inhaler Generic drug:  albuterol Your last dose was: Your next dose is:    
   
   
 Dose:  2 Puff Take 2 Puffs by inhalation every six (6) hours as needed. Refills:  0  
     
   
   
   
  
 * albuterol sulfate 2.5 mg/0.5 mL Nebu nebulizer solution Commonly known as:  PROVENTIL;VENTOLIN Your last dose was: Your next dose is:    
   
   
 Dose:  2.5 mg  
2.5 mg by Nebulization route every four (4) hours as needed for Wheezing. Refills:  0 REMERON 45 mg tablet Generic drug:  mirtazapine Your last dose was: Your next dose is:    
   
   
 Dose:  90 mg Take 90 mg by mouth nightly. Refills:  0  
     
   
   
   
  
 RITALIN 20 mg tablet Generic drug:  methylphenidate HCl Your last dose was: Your next dose is:    
   
   
 Dose:  20 mg Take 20 mg by mouth four (4) times daily. Refills:  0 SUDAFED 30 mg tablet Generic drug:  pseudoephedrine Your last dose was: Your next dose is:    
   
   
 Dose:  60 mg Take 60 mg by mouth every four (4) hours as needed. Refills:  0  
     
   
   
   
  
 TENORMIN 100 mg tablet Generic drug:  atenolol Your last dose was: Your next dose is:    
   
   
 Dose:  100 mg Take 100 mg by mouth nightly. Refills:  0  
     
   
   
   
  
 TRINTELLIX 10 mg tablet Generic drug:  vortioxetine Your last dose was: Your next dose is:    
   
   
 Dose:  30 mg Take 30 mg by mouth daily. Refills:  0  
     
   
   
   
  
 TYLENOL EXTRA STRENGTH 500 mg tablet Generic drug:  acetaminophen Your last dose was: Your next dose is:    
   
   
 Dose:  1000 mg Take 1,000 mg by mouth every six (6) hours as needed for Pain. Refills:  0 VALTREX 500 mg tablet Generic drug:  valACYclovir Your last dose was: Your next dose is: Take  by mouth two (2) times daily as needed. Refills:  0  
     
   
   
   
  
 VITAMIN C 500 mg tablet Generic drug:  ascorbic acid (vitamin C) Your last dose was: Your next dose is:    
   
   
 Dose:  1000 mg Take 1,000 mg by mouth two (2) times a day. Refills:  0  
     
   
   
   
  
 XANAX 0.5 mg tablet Generic drug:  ALPRAZolam  
   
Your last dose was: Your next dose is:    
   
   
 Dose:  1 mg Take 1 mg by mouth four (4) times daily as needed. To 1 mg Refills:  0  
     
   
   
   
  
 * Notice: This list has 2 medication(s) that are the same as other medications prescribed for you. Read the directions carefully, and ask your doctor or other care provider to review them with you. STOP taking these medications CeleBREX 100 mg capsule Generic drug:  celecoxib  
   
  
 CO Q-10 100 mg capsule Generic drug:  co-enzyme Q-10  
   
  
 ESTRACE 2 mg tablet Generic drug:  estradiol HYDROcodone-ibuprofen 7.5-200 mg per tablet Commonly known as:  VICOPROFEN  
   
  
 lidocaine 5 % Commonly known as:  LIDODERM  
   
  
 oxyMORphone 20 mg ER tablet Commonly known as:  OPANA ER  
   
  
 PROMETRIUM 200 mg capsule Generic drug:  progesterone  
   
  
 rizatriptan 10 mg disintegrating tablet Commonly known as:  MAXALT-MLT Where to Get Your Medications Information on where to get these meds will be given to you by the nurse or doctor. ! Ask your nurse or doctor about these medications  
  aspirin 325 mg tablet  
 meloxicam 7.5 mg tablet Discharge Instructions [facility] Patient Discharge Instructions Iris Hopkins / [id number] : 1955 Admitted 8/10/2017 Discharged: 8/10/2017 IF YOU HAVE ANY PROBLEMS ONCE YOU ARE AT HOME CALL THE FOLLOWING NUMBERS:  
Main office number: [phone] Your follow up appointment to see either Dr. Wilbur West PA-C, or [facility] DRISS as scheduled in 2 weeks. If you are unsure of your appointment date call the office at [phone]. Medication Instructions · Resume your home medictions as directed, you may have directed not to resume supplements until after your follow up. · A prescription for pain medication has been given · It is important that you take the medication exactly as they are prescribed. · Keep your medication in the bottles provided by the pharmacist and keep a list of the medication names, dosages, and times to be taken in your wallet. · Do not take other medications without consulting your doctor. What to do at [facility] Resume your prehospital diet. If you have excessive nausea or vomitting call your doctor's office. Be sure to maintain adequate fluid intake. Some pain medications may cause constipation. Remember to drink fluids, stay as active as possible, and eat plenty of fiber-rich foods. Begin In-Home Physical Therapy; 3 times a week to work on gait training, range of motion, strengthening, and weight bearing exercises as tolerable. Continue to use your walker or cane when walking. May progress from the walker to a cane to complete total bearing as tolerable. Patient may shower. Wrap incision with plastic wrap/covering to prevent incision from getting wet. Avoid complete immersion. YOUR DRESSING SHOULD BE CHANGED BY YOUR HOME HEALTH NURSE 3-5 DAYS AFTER SURGERY. When to Call - Call if you have a temperature greater then 101 
- Unable to keep food down - Are unable to bear any wieght - Need a pain medication refill Information obtained by : 
I understand that if any problems occur once I am at home I am to contact my physician. I understand and acknowledge receipt of the instructions indicated above. Physician's or R.N.'s Signature                                                                  Date/Time Patient or Representative Signature                                                          Date/Time Discharge Orders None [facility] Announcement We are excited to announce that we are making your provider's discharge notes available to you in [employer]. You will see these notes when they are completed and signed by the physician that discharged you from your recent hospital stay. If you have any questions or concerns about any information you see in [employer], please call the Health Information Department where you were seen or reach out to your Primary Care Provider for more information about your plan of care. Introducing [facility]! Tory Duffy introduces [employer] patient portal. Now you can access parts of your medical record, email your doctor's office, and request medication refills online. 1. In your internet browser, go to [email or web address] 2. Click on the First Time User? Click Here link in the Sign In box. You will see the New Member Sign Up page. 3. Enter your [employer] Access Code exactly as it appears below. You will not need to use this code after youve completed the sign-up process. If you do not sign up before the expiration date, you must request a new code. · [employer] Access Code: [email or web address] Expires: 10/24/2017  2:06 PM 
 
 4. Enter the last four digits of your Social Security Number (xxxx) and Date of Birth (mm/dd/yyyy) as indicated and click Submit. You will be taken to the next sign-up page. 5. Create a [employer] ID. This will be your [employer] login ID and cannot be changed, so think of one that is secure and easy to remember. 6. Create a [employer] password. You can change your password at any time. 7. Enter your Password Reset Question and Answer. This can be used at a later time if you forget your password. 8. Enter your e-mail address. You will receive e-mail notification when new information is available in [address] 9. Click Sign Up. You can now view and download portions of your medical record. 10. Click the Download Summary menu link to download a portable copy of your medical information. If you have questions, please visit the Frequently Asked Questions section of the [employer] website. Remember, [employer] is NOT to be used for urgent needs. For medical emergencies, dial 911. Now available from your iPhone and Android! General Information Please provide this summary of care documentation to your next provider. Patient Signature:  ____________________________________________________________ Date:  ____________________________________________________________  
  
[facility] Provider Signature:  ____________________________________________________________ Date:  ____________________________________________________________

## 2017-08-10 NOTE — DISCHARGE SUMMARY
[address] [address]     DISCHARGE SUMMARY     PATIENT: Alejandra Tay     MRN: [id number]   ADMIT DATE: 8/10/2017   BILLIN   DISCHARGE DATE:      ATTENDING: Hiram Ybarra MD   DICTATING: MIRZA Green     ADMISSION DIAGNOSIS: OSTEOARTHRITIS LEFT HIP  Osteoarthritis of left hip    DISCHARGE DIAGNOSIS: Status post LEFT TOTAL HIP ARTHROPLASTY    HISTORY OF PRESENT ILLNESS: The patient is a 65y.o. year-old female   with ongoing left hip pain secondary to osteoarthritis. The patient's pain has persisted and progressed despite conservative treatments and therapies. The patient has at this time opted for surgical intervention.     PAST MEDICAL HISTORY:   Past Medical History:   Diagnosis Date    Anxiety disorder     Arthritis     Asthma     Attention deficit disorder (ADD)     Chronic pain     lumbar and cervical spine and joints    DDD (degenerative disc disease), cervical     DDD (degenerative disc disease), lumbar     Depression     major depression and anxiety, PTSD    Fatty liver disease, nonalcoholic     Fibromyalgia     GERD (gastroesophageal reflux disease)     Headache     Hypertension     Ill-defined condition     Migraine headache     Neuralgia     secondary to shingles    Osteoarthritis of left hip 2017    Positive NICOLE (antinuclear antibody)     Post-menopausal     Premature ventricular contractions     occassional    Vitamin B12 deficiency     Vitamin D deficiency        PAST SURGICAL HISTORY:   Past Surgical History:   Procedure Laterality Date    ENDOSCOPY, COLON, DIAGNOSTIC         ALLERGIES:   Allergies   Allergen Reactions    Other Food Rash and Itching     Black beans    Cymbalta [Duloxetine] Other (comments)     Tachycardia, fever, H/A and HTN    Egg Itching    Keflex [Cephalexin] Rash    Lisinopril Cough    Pcn [Penicillins] Hives        CURRENT MEDICATIONS:  A list of medications prior to the time of admission include:  Prior to Admission medications    Medication Sig Start Date End Date Taking? Authorizing Provider   aspirin (ASPIRIN) 325 mg tablet Take 1 Tab by mouth two (2) times a day for 21 days. 8/10/17 8/31/17 Yes MIRZA Lucia   meloxicam (MOBIC) 7.5 mg tablet Take 1 Tab by mouth two (2) times a day for 14 days. 8/10/17 8/24/17 Yes MIRZA Lucia   losartan (COZAAR) 100 mg tablet Take 100 mg by mouth daily. Yes Historical Provider   vortioxetine (TRINTELLIX) 10 mg tablet Take 30 mg by mouth daily. Yes Historical Provider   mirtazapine (REMERON) 45 mg tablet Take 90 mg by mouth nightly. Yes Historical Provider   morphine CR (MS CONTIN) 100 mg CR tablet Take 100 mg by mouth. [id number]   Yes Historical Provider   methylphenidate (RITALIN) 20 mg tablet Take 20 mg by mouth four (4) times daily. Yes Historical Provider   oxyCODONE IR (OXY-IR) 30 mg immediate release tablet Take 30 mg by mouth four (4) times daily. [id number]--1900   Yes Historical Provider   celecoxib (CELEBREX) 100 mg capsule Take 100 mg by mouth two (2) times daily as needed for Pain. Yes Historical Provider   famotidine (PEPCID) 40 mg tablet Take 40 mg by mouth daily as needed. Yes Historical Provider   eszopiclone (LUNESTA) 2 mg tablet Take 2 mg by mouth nightly as needed for Sleep. Yes Historical Provider   multivitamin (ONE A DAY) tablet Take 2 Tabs by mouth daily. Yes Historical Provider   MAGNESIUM CITRATE PO Take 250 mg by mouth daily. Yes Historical Provider   ascorbic acid, vitamin C, (VITAMIN C) 500 mg tablet Take 1,000 mg by mouth two (2) times a day. Yes Historical Provider   aspirin (ASPIRIN) 325 mg tablet Take 325 mg by mouth daily. Yes Historical Provider   co-enzyme Q-10 (CO Q-10) 100 mg capsule Take 100 mg by mouth daily. Yes Historical Provider   lidocaine (LIDODERM) 5 % 1 Patch by TransDERmal route every twelve (12) hours as needed.  Apply patch to the affected area for 12 hours a day and remove for 12 hours a day. Yes Historical Provider   estradiol (ESTRACE) 2 mg tablet Take 2 mg by mouth nightly. Yes Historical Provider   progesterone (PROMETRIUM) 200 mg capsule Take 200 mg by mouth nightly. Yes Historical Provider   atenolol (TENORMIN) 100 mg tablet Take 100 mg by mouth nightly. Yes Historical Provider   hydrochlorothiazide (HYDRODIURIL) 25 mg tablet Take 25 mg by mouth daily. Yes Historical Provider   ALPRAZolam (XANAX) 0.5 mg tablet Take 1 mg by mouth four (4) times daily as needed. To 1 mg    Yes Historical Provider   Calcium-Cholecalciferol, D3, (CALTRATE 600 + D) 600 mg(1,500mg) -400 unit Chew Take 1 Tab by mouth daily. Yes Historical Provider   pseudoephedrine (SUDAFED) 30 mg tablet Take 60 mg by mouth every four (4) hours as needed. Yes Historical Provider   albuterol (PROVENTIL HFA) 90 mcg/actuation inhaler Take 2 Puffs by inhalation every six (6) hours as needed. Yes Historical Provider   fluticasone-salmeterol (ADVAIR DISKUS) 250-50 mcg/dose diskus inhaler Take 1 Puff by inhalation every twelve (12) hours. Yes Historical Provider   albuterol sulfate (PROVENTIL;VENTOLIN) 2.5 mg/0.5 mL nebu nebulizer solution 2.5 mg by Nebulization route every four (4) hours as needed for Wheezing. Historical Provider   lubiPROStone (AMITIZA) 24 mcg capsule Take 24 mcg by mouth two (2) times daily as needed for Constipation. Historical Provider   DULCOLAX, BISACODYL, PO Take 1 Cap by mouth daily as needed. 1-3 tablets    Historical Provider   acetaminophen (TYLENOL EXTRA STRENGTH) 500 mg tablet Take 1,000 mg by mouth every six (6) hours as needed for Pain. Historical Provider   NALOXONE HCL (NARCAN IJ) 0.4 mg by SubCUTAneous route as needed. Historical Provider   OnabotulinumtoxinA (BOTOX) 200 unit SolR 100 Units by Injection route once as needed for 1 dose.  8/13/13   Ladonna Huitron MD   HYDROcodone-ibuprofen (VICOPROFEN) 7.5-200 mg per tablet Take 1 Tab by mouth two (2) times daily as needed for Pain for 30 days. FILL ON OR AFTER:  10/31/12  Indications: PAIN 10/31/12 11/30/12  Eben Barr MD   HYDROcodone-ibuprofen (VICOPROFEN) 7.5-200 mg per tablet Take 1 Tab by mouth four (4) times daily as needed for Pain for 10 days. 10/23/12 11/2/12  Eben Barr MD   cyanocobalamin (VITAMIN B12) 1,000 mcg/mL injection 10,000 mcg by IntraMUSCular route every thirty (30) days. Historical Provider   valACYclovir (VALTREX) 500 mg tablet Take  by mouth two (2) times daily as needed. Historical Provider   oxymorphone (OPANA ER) 20 mg ER tablet Take 1 Tab by mouth every twelve (12) hours for 30 days. For chronic pain  Fill on or after:  9/5/12  Indications: CHRONIC PAIN WITH OPIOID TOLERANCE, SEVERE PAIN WITH OPIOID TOLERANCE 9/5/12 10/5/12  Eben Barr MD   oxymorphone (OPANA ER) 20 mg ER tablet Take 1 Tab by mouth every twelve (12) hours for 30 days. For chronic pain  Fill on or after:  01/3/12  Indications: CHRONIC PAIN WITH OPIOID TOLERANCE, SEVERE PAIN WITH OPIOID TOLERANCE 10/3/12 11/2/12  Eben Barr MD   rizatriptan (MAXALT-MLT) 10 mg disintegrating tablet Take 1 Tab by mouth once as needed for Migraine for 90 days.  Indications: MIGRAINE 9/5/12 12/4/12  Eben Barr MD       FAMILY HISTORY:   Family History   Problem Relation Age of Onset    Headache Mother     Asthma Mother     Hypertension Father     Headache Sister     Asthma Sister     Headache Maternal Aunt     Bipolar Disorder Maternal Aunt     Heart Attack Maternal Grandfather     Stroke Maternal Grandfather     Heart Attack Paternal Grandfather        SOCIAL HISTORY:   Social History     Social History    Marital status:      Spouse name: N/A    Number of children: N/A    Years of education: N/A     Social History Main Topics    Smoking status: Never Smoker    Smokeless tobacco: Never Used    Alcohol use 1.8 oz/week     1 Glasses of wine, 1 Cans of beer, 1 Shots of liquor per week      Comment: No alcoholic bevearges x 1 month    Drug use: No    Sexual activity: Yes     Other Topics Concern    None     Social History Narrative       REVIEW OF SYSTEMS: All review of systems are negative. PHYSICAL EXAMINATION: For a detailed physical exam, please refer to the patient's chart. HOSPITAL COURSE: The patient was taken to surgery the day of admission. she underwent left total hip replacement via the anterior approach. Operative course was benign. Estimated blood loss approximately 300 cc. The patient was taken to the PACU in stable condition and was later taken to the floor in stable condition. Post-op Day #1, patient has done very well.  she has had little to no pain. she had been cleared by physical therapy with stair training. she was placed on Aspirin for DVT prophylaxis. her vitals have remained stable. she has also remained hemodynamically stable. The patient has been recommended for discharge home. DISCHARGE INSTRUCTIONS: The patient is to be discharged home. she is to continue on her prior medications per the medication reconciliation form, to which we will add:         1)  Aspirin 325 mg; 1 tablet p.o. b.i.d. X 21 days         2)  Mobic 7.5 mg; 1 tablet p.o. BID x 14 days      The patient is to continue at home with home physical therapy 3 times a week to work on gait training, range of motion, strengthening, and weightbearing exercises as tolerated on her left lower extremity. The patient is to progress from a walker to a cane to complete total weightbearing as tolerable. The patient is to continue to keep her incision dry. The patient is to followup with Dr. Lizette Barkley, Duncan Valdez PA-C, and/or Mike Henley PA-C in the office approximately 10-14 days status post for x-rays and further evaluation.       Bam Collier  8/10/2017

## 2017-08-10 NOTE — PROGRESS NOTES
1100 Pt arrived on the unit, VSS, assessment complete, see doc flow sheet, teds and scds applied at this time. Pt stated that she needed to use the rest room. Informed her that we would be attempting to ambulate to the restroom. Pt refused to ambulate at this time and will wait to use the restroom. Educated to call when she needs assistance. 1130 PAIN medication given. Potential medication side effects explained to patient, patient verbalizes understanding, opportunities for questions provided. Patient stable, no apparent distress at this time, bed in locked position, call bell within reach. Pt stated that her pain is a 8/10 at this time. 1310 PAIN medication given. Potential medication side effects explained to patient, patient verbalizes understanding, opportunities for questions provided. Patient stable, no apparent distress at this time, bed in locked position, call bell within reach. 1753 PAIN medication given. Potential medication side effects explained to patient, patient verbalizes understanding, opportunities for questions provided. Patient stable, no apparent distress at this time, bed in locked position, call bell within reach. Shift summary: Patient had uneventful shift. Patient ambulated with assistance. Pain remained well-controlled with medication.  No issues/concerns at this time

## 2017-08-10 NOTE — PROGRESS NOTES
1918 - Assumed care of pt at this time. Pt in bed with no signs of distress. Pt left with call light within reach and encouraged to call for assistance. 2027 - Pt state pain as 6/10. Pt received medication per MAR and ice. Pt informed of the side effects of the medication and the next time medication can be given. Pt encouraged to call for assistance and to manage pain. Pt encouraged to use incentive spirometer. Pt left with call light within reach, bed in low position and wheels locked. Will continue to monitor. 2034 -  Head to toe assessment performed at this time. Pt denies any chest pain or SOB. Pt denies any numbness or tingling to extremities. Pt encouraged to manage pain and to use the incentive spirometer. Pt educated on the side effects of medications taken. Pt left with call light within reach and bed in low position. Will continue to monitor. 2150 - Pt state pain as 6/10. Pt received medication per MAR and ice. Pt informed of the side effects of the medication and the next time medication can be given. Pt encouraged to call for assistance and to manage pain. Pt encouraged to use incentive spirometer. Pt left with call light within reach, bed in low position and wheels locked. Will continue to monitor. [address] [place] [address] MIZRA Alfaro paged in reference to pt pain medication. [facility] with MIRZA Valera and she said that she will speak with Dr. Willard Libman in reference to pain medication for the pt.

## 2017-08-10 NOTE — PERIOP NOTES
Verbal hand off at the bedside with Stephanie Barone RN, provided opportunity for questions. Dressing clean dry and intact palpable pulses and able to move toes.

## 2017-08-10 NOTE — OP NOTES
[address] [facility] [address] Medicine  Total Hip Arthroplasty      Patient: Lucille Mcnally MRN: [id number]  SSN: [id number]    YOB: 1955  Age: 65 y.o. Sex: female      Date of Surgery: 8/10/2017   Preoperative Diagnosis: OSTEOARTHRITIS LEFT HIP   Postoperative Diagnosis: OSTEOARTHRITIS LEFT HIP   Location: [facility]  Surgeon: Zo Solorzano MD  Assistant: Lazara Landry PA-C    Anesthesia: general    Procedure: Total Left Hip Arthroplasty    Findings: Degenerative joint disease of the left hip. Estimated Blood Loss: 300ml    Specimens: None    Implants:   Implant Name Type Inv. Item Serial No.  Lot No. LRB No. Used Action   LINER ACET PINN NEUT [id number] -- ALTRX - [id number]  LINER ACET PINN NEUT [id number] -- ALTRX  [facility] [employer] [id number] Left 1 Implanted   CUP ACET SECTOR GRIPTION 48MM -- TI - [id number]  CUP ACET SECTOR GRIPTION 48MM -- TI  [facility] [employer] [id number] Left 1 Implanted   Femoral stem ACTIS    [employer] [id number] Left 1 Implanted   HEAD FEM CER 12/14 32MM +9 -- DELTA - [id number]   HEAD FEM CER 12/14 32MM +9 -- DELTA   [facility] [employer] [id number] Left 1 Implanted       Procedure Detail:  After the patient was brought to the operating suite, She was effectively anesthetized using general anesthesia, then transferred to the Horton table and secured in a standard fashion. Her left hip was then prepped and draped in a normal sterile orthopedic fashion. She was given appropriate intravenous antibiotics preoperatively. After a proper timeout was performed, a direct anterior approach to the hip was performed using a short Bolden-Jean interval. Anterior capsulotomy was performed. The degenerative changes of the hip were noted. Femoral neck osteotomy was then performed to the templated area. The head and neck were removed. The pulvinar and labrum were excised. The acetabulum was then reamed up to 47 mm with good bleeding cancellous bone obtained. The cup was then irrigated with pulse lavage system. A 48 mm Gription cup was then impacted in place with excellent stable fixation obtained, placing the cup at about 45 degrees of abduction, 20 degrees of anteversion. The liner was then impacted in place. A screw was not placed. Attention was turned to the femur, which was delivered into the wound with a combination of extension, external rotation, and adduction, and using the hook on the Galvin table to deliver the femur into the wound. The canal was broached up to a size 3 for the Actis stem system with excellent stable fixation obtained. A trial reduction was then performed with the standard neck offset and 32 mm head balls with various neck lengths. With the +9, she appeared to have equalization of leg lengths and restoration of offset radiographically, and excellent functional stability was noted. The trial broach was removed. The canal was irrigated with the pulse lavage system. The final components were impacted in place with excellent stable fixation obtained once again. The final reduction was performed and once again leg lengths and offset were restored radiographically, using the C-arm radiographically intraoperatively, and excellent functional stability was noted. The wound was then irrigated one more time, and then closed in layers. The fascia of the tensor was closed with #1 Vicryl in a running type stitch. Subcutaneous tissue was closed with 2-0 Vicryl in a simple buried stitch, and the skin was closed with Prineo. Dry, sterile dressing was then applied. She tolerated this well, was transferred to the bed, and taken to recovery room, extubated, in stable condition. All sponge and needle counts were correct.     Signed By: Jillian Cruz MD     August 10, 2017

## 2017-08-10 NOTE — PERIOP NOTES
Patient awake and alert complaining of pain not improving spoke with Dr. Miguelito Fink briefed on patients complains, explained what was given thus far was told to switch to dilaudid.

## 2017-08-10 NOTE — IP AVS SNAPSHOT
Wendy Marcos 
 
 
 [address] [facility] [address] 
[phone] Patient: Alejandra Tay MRN: [id number] SSF:[id number] Current Discharge Medication List  
  
START taking these medications Dose & Instructions Dispensing Information Comments Morning Noon Evening Bedtime  
 meloxicam 7.5 mg tablet Commonly known as:  MOBIC Your last dose was: Your next dose is:    
   
   
 Dose:  7.5 mg Take 1 Tab by mouth two (2) times a day for 14 days. Quantity:  28 Tab Refills:  0 CONTINUE these medications which have CHANGED Dose & Instructions Dispensing Information Comments Morning Noon Evening Bedtime  
 aspirin 325 mg tablet Commonly known as:  ASPIRIN What changed:  when to take this Your last dose was: Your next dose is:    
   
   
 Dose:  325 mg Take 1 Tab by mouth two (2) times a day for 21 days. Quantity:  42 Tab Refills:  0 CONTINUE these medications which have NOT CHANGED Dose & Instructions Dispensing Information Comments Morning Noon Evening Bedtime ADVAIR DISKUS 250-50 mcg/dose diskus inhaler Generic drug:  fluticasone-salmeterol Your last dose was: Your next dose is:    
   
   
 Dose:  1 Puff Take 1 Puff by inhalation every twelve (12) hours. Refills:  0 AMITIZA 24 mcg capsule Generic drug:  lubiPROStone Your last dose was: Your next dose is:    
   
   
 Dose:  24 mcg Take 24 mcg by mouth two (2) times daily as needed for Constipation. Refills:  0  
     
   
   
   
  
 CALTRATE 600 + D 600 mg(1,500mg) -400 unit Chew Generic drug:  Calcium-Cholecalciferol (D3) Your last dose was: Your next dose is:    
   
   
 Dose:  1 Tab Take 1 Tab by mouth daily. Refills:  0  
     
   
   
   
  
 cyanocobalamin 1,000 mcg/mL injection Commonly known as:  VITAMIN B12 Your last dose was: Your next dose is:    
   
   
 Dose:  01071 mcg  
10,000 mcg by IntraMUSCular route every thirty (30) days. Refills:  0 DULCOLAX (BISACODYL) PO Your last dose was: Your next dose is:    
   
   
 Dose:  1 Cap Take 1 Cap by mouth daily as needed. 1-3 tablets Refills:  0  
     
   
   
   
  
 hydroCHLOROthiazide 25 mg tablet Commonly known as:  HYDRODIURIL Your last dose was: Your next dose is:    
   
   
 Dose:  25 mg Take 25 mg by mouth daily. Refills:  0  
     
   
   
   
  
 losartan 100 mg tablet Commonly known as:  COZAAR Your last dose was: Your next dose is:    
   
   
 Dose:  100 mg Take 100 mg by mouth daily. Refills:  0 LUNESTA 2 mg tablet Generic drug:  eszopiclone Your last dose was: Your next dose is:    
   
   
 Dose:  2 mg Take 2 mg by mouth nightly as needed for Sleep. Refills:  0 MAGNESIUM CITRATE PO Your last dose was: Your next dose is:    
   
   
 Dose:  250 mg Take 250 mg by mouth daily. Refills:  0  
     
   
   
   
  
 MS CONTIN 100 mg CR tablet Generic drug:  morphine CR Your last dose was: Your next dose is:    
   
   
 Dose:  100 mg Take 100 mg by mouth. [id number] Refills:  0  
     
   
   
   
  
 multivitamin tablet Commonly known as:  ONE A DAY Your last dose was: Your next dose is:    
   
   
 Dose:  2 Tab Take 2 Tabs by mouth daily. Refills:  0 Rehana Amass Your last dose was: Your next dose is:    
   
   
 Dose:  0.4 mg  
0.4 mg by SubCUTAneous route as needed. Refills:  0  
     
   
   
   
  
 onabotulinumtoxinA 200 unit injection Commonly known as:  BOTOX Your last dose was: Your next dose is:    
   
   
 Dose:  100 Units 100 Units by Injection route once as needed for 1 dose. Quantity:  1 Each Refills:  0  
     
   
   
   
  
 oxyCODONE IR 30 mg immediate release tablet Commonly known as:  OXY-IR Your last dose was: Your next dose is:    
   
   
 Dose:  30 mg Take 30 mg by mouth four (4) times daily. [facility] Refills:  0 PEPCID 40 mg tablet Generic drug:  famotidine Your last dose was: Your next dose is:    
   
   
 Dose:  40 mg Take 40 mg by mouth daily as needed. Refills:  0  
     
   
   
   
  
 * PROVENTIL HFA 90 mcg/actuation inhaler Generic drug:  albuterol Your last dose was: Your next dose is:    
   
   
 Dose:  2 Puff Take 2 Puffs by inhalation every six (6) hours as needed. Refills:  0  
     
   
   
   
  
 * albuterol sulfate 2.5 mg/0.5 mL Nebu nebulizer solution Commonly known as:  PROVENTIL;VENTOLIN Your last dose was: Your next dose is:    
   
   
 Dose:  2.5 mg  
2.5 mg by Nebulization route every four (4) hours as needed for Wheezing. Refills:  0 REMERON 45 mg tablet Generic drug:  mirtazapine Your last dose was: Your next dose is:    
   
   
 Dose:  90 mg Take 90 mg by mouth nightly. Refills:  0  
     
   
   
   
  
 RITALIN 20 mg tablet Generic drug:  methylphenidate HCl Your last dose was: Your next dose is:    
   
   
 Dose:  20 mg Take 20 mg by mouth four (4) times daily. Refills:  0 SUDAFED 30 mg tablet Generic drug:  pseudoephedrine Your last dose was: Your next dose is:    
   
   
 Dose:  60 mg Take 60 mg by mouth every four (4) hours as needed. Refills:  0  
     
   
   
   
  
 TENORMIN 100 mg tablet Generic drug:  atenolol Your last dose was: Your next dose is:    
   
   
 Dose:  100 mg Take 100 mg by mouth nightly. Refills:  0 TRINTELLIX 10 mg tablet Generic drug:  vortioxetine Your last dose was: Your next dose is:    
   
   
 Dose:  30 mg Take 30 mg by mouth daily. Refills:  0  
     
   
   
   
  
 TYLENOL EXTRA STRENGTH 500 mg tablet Generic drug:  acetaminophen Your last dose was: Your next dose is:    
   
   
 Dose:  1000 mg Take 1,000 mg by mouth every six (6) hours as needed for Pain. Refills:  0 VALTREX 500 mg tablet Generic drug:  valACYclovir Your last dose was: Your next dose is: Take  by mouth two (2) times daily as needed. Refills:  0  
     
   
   
   
  
 VITAMIN C 500 mg tablet Generic drug:  ascorbic acid (vitamin C) Your last dose was: Your next dose is:    
   
   
 Dose:  1000 mg Take 1,000 mg by mouth two (2) times a day. Refills:  0  
     
   
   
   
  
 XANAX 0.5 mg tablet Generic drug:  ALPRAZolam  
   
Your last dose was: Your next dose is:    
   
   
 Dose:  1 mg Take 1 mg by mouth four (4) times daily as needed. To 1 mg Refills:  0  
     
   
   
   
  
 * Notice: This list has 2 medication(s) that are the same as other medications prescribed for you. Read the directions carefully, and ask your doctor or other care provider to review them with you. STOP taking these medications CeleBREX 100 mg capsule Generic drug:  celecoxib  
   
  
 CO Q-10 100 mg capsule Generic drug:  co-enzyme Q-10  
   
  
 ESTRACE 2 mg tablet Generic drug:  estradiol HYDROcodone-ibuprofen 7.5-200 mg per tablet Commonly known as:  VICOPROFEN  
   
  
 lidocaine 5 % Commonly known as:  LIDODERM  
   
  
 oxyMORphone 20 mg ER tablet Commonly known as:  OPANA ER  
   
  
 PROMETRIUM 200 mg capsule Generic drug:  progesterone  
   
  
 rizatriptan 10 mg disintegrating tablet Commonly known as:  MAXALT-MLT Where to Get Your Medications Information on where to get these meds will be given to you by the nurse or doctor. ! Ask your nurse or doctor about these medications  
  aspirin 325 mg tablet  
 meloxicam 7.5 mg tablet

## 2017-08-10 NOTE — PERIOP NOTES
Report received care assumed. Patient resting quietly patent airway resp easy and even dressing clean dry and intact palpale pulse.

## 2017-08-10 NOTE — PERIOP NOTES
TRANSFER - IN REPORT:    Verbal report received from THOMAS Carnes RN on Xavi Tong  being received from OR for routine progression of care      Report consisted of patients Situation, Background, Assessment and   Recommendations(SBAR). Information from the following report(s) SBAR was reviewed with the receiving nurse. Opportunity for questions and clarification was provided. Assessment completed upon patients arrival to unit and care assumed.

## 2017-08-11 VITALS
HEART RATE: 67 BPM | WEIGHT: 187.38 LBS | SYSTOLIC BLOOD PRESSURE: 109 MMHG | BODY MASS INDEX: 33.2 KG/M2 | TEMPERATURE: 98.2 F | OXYGEN SATURATION: 98 % | DIASTOLIC BLOOD PRESSURE: 59 MMHG | RESPIRATION RATE: 18 BRPM | HEIGHT: 63 IN

## 2017-08-11 LAB
ANION GAP BLD CALC-SCNC: 7 MMOL/L (ref 3–18)
BUN SERPL-MCNC: 17 MG/DL (ref 7–18)
BUN/CREAT SERPL: 18 (ref 12–20)
CALCIUM SERPL-MCNC: 7.9 MG/DL (ref 8.5–10.1)
CHLORIDE SERPL-SCNC: 109 MMOL/L (ref 100–108)
CO2 SERPL-SCNC: 28 MMOL/L (ref 21–32)
CREAT SERPL-MCNC: 0.95 MG/DL (ref 0.6–1.3)
ERYTHROCYTE [DISTWIDTH] IN BLOOD BY AUTOMATED COUNT: 13.2 % (ref 11.6–14.5)
GLUCOSE SERPL-MCNC: 93 MG/DL (ref 74–99)
HCT VFR BLD AUTO: 30.3 % (ref 35–45)
HGB BLD-MCNC: 10.1 G/DL (ref 12–16)
MCH RBC QN AUTO: 32.4 PG (ref 24–34)
MCHC RBC AUTO-ENTMCNC: 33.3 G/DL (ref 31–37)
MCV RBC AUTO: 97.1 FL (ref 74–97)
PLATELET # BLD AUTO: 190 K/UL (ref 135–420)
PMV BLD AUTO: 10.4 FL (ref 9.2–11.8)
POTASSIUM SERPL-SCNC: 4.2 MMOL/L (ref 3.5–5.5)
RBC # BLD AUTO: 3.12 M/UL (ref 4.2–5.3)
SODIUM SERPL-SCNC: 144 MMOL/L (ref 136–145)
WBC # BLD AUTO: 9.9 K/UL (ref 4.6–13.2)

## 2017-08-11 PROCEDURE — 80048 BASIC METABOLIC PNL TOTAL CA: CPT | Performed by: PHYSICIAN ASSISTANT

## 2017-08-11 PROCEDURE — 74011250636 HC RX REV CODE- 250/636: Performed by: PHYSICIAN ASSISTANT

## 2017-08-11 PROCEDURE — 97116 GAIT TRAINING THERAPY: CPT

## 2017-08-11 PROCEDURE — 74011250637 HC RX REV CODE- 250/637: Performed by: ORTHOPAEDIC SURGERY

## 2017-08-11 PROCEDURE — 74011250637 HC RX REV CODE- 250/637: Performed by: PHYSICIAN ASSISTANT

## 2017-08-11 PROCEDURE — 74011250636 HC RX REV CODE- 250/636: Performed by: ORTHOPAEDIC SURGERY

## 2017-08-11 PROCEDURE — 85027 COMPLETE CBC AUTOMATED: CPT | Performed by: PHYSICIAN ASSISTANT

## 2017-08-11 PROCEDURE — 36415 COLL VENOUS BLD VENIPUNCTURE: CPT | Performed by: PHYSICIAN ASSISTANT

## 2017-08-11 PROCEDURE — 97110 THERAPEUTIC EXERCISES: CPT

## 2017-08-11 RX ORDER — KETOROLAC TROMETHAMINE 10 MG/1
10 TABLET, FILM COATED ORAL ONCE
Status: COMPLETED | OUTPATIENT
Start: 2017-08-11 | End: 2017-08-11

## 2017-08-11 RX ADMIN — Medication 1000 MG: at 08:00

## 2017-08-11 RX ADMIN — KETOROLAC TROMETHAMINE 10 MG: 10 TABLET, FILM COATED ORAL at 13:35

## 2017-08-11 RX ADMIN — MELOXICAM 7.5 MG: 7.5 TABLET ORAL at 08:01

## 2017-08-11 RX ADMIN — DOCUSATE SODIUM 100 MG: 100 CAPSULE, LIQUID FILLED ORAL at 08:00

## 2017-08-11 RX ADMIN — HYDROCHLOROTHIAZIDE 25 MG: 25 TABLET ORAL at 08:00

## 2017-08-11 RX ADMIN — MORPHINE SULFATE 100 MG: 100 TABLET, EXTENDED RELEASE ORAL at 08:00

## 2017-08-11 RX ADMIN — Medication 10 ML: at 06:14

## 2017-08-11 RX ADMIN — CALCIUM CARBONATE-VITAMIN D TAB 500 MG-200 UNIT 1 TABLET: 500-200 TAB at 08:00

## 2017-08-11 RX ADMIN — ASPIRIN 325 MG: 325 TABLET, FILM COATED ORAL at 08:01

## 2017-08-11 RX ADMIN — VANCOMYCIN HYDROCHLORIDE 1250 MG: 10 INJECTION, POWDER, LYOPHILIZED, FOR SOLUTION INTRAVENOUS at 07:14

## 2017-08-11 RX ADMIN — PANTOPRAZOLE SODIUM 40 MG: 40 TABLET, DELAYED RELEASE ORAL at 08:01

## 2017-08-11 RX ADMIN — KETOROLAC TROMETHAMINE 15 MG: 15 INJECTION, SOLUTION INTRAMUSCULAR; INTRAVENOUS at 06:13

## 2017-08-11 RX ADMIN — OXYCODONE HYDROCHLORIDE 30 MG: 5 TABLET ORAL at 08:00

## 2017-08-11 RX ADMIN — OXYCODONE HYDROCHLORIDE 30 MG: 5 TABLET ORAL at 12:51

## 2017-08-11 NOTE — PROGRESS NOTES
Met with pt in room, pt sitting in chair at bedside. Pt plans discharge home with spouse to assist. [address] offered and pt chose [facility]   for follow up; referral placed with CMS. RW to be delivered by [facility] to pt room prior to discharge. 1300- CM spoke with Florinda Egan at [facility] will see pt tomorrow, PT will start Monday, their therapists don't work the weekend. Care Management Interventions  PCP Verified by CM:  Yes  Transition of Care Consult (CM Consult): [address]: No  Reason Outside [place]: Out of service area [facility])  Discharge Durable Medical Equipment: Yes  Physical Therapy Consult: Yes  Occupational Therapy Consult: Yes  Current Support Network: Lives with Spouse, Own Home  Confirm Follow Up Transport: Family  Plan discussed with Pt/Family/Caregiver: Yes  Freedom of Choice Offered: Yes  Discharge Location  Discharge Placement: Home with home health

## 2017-08-11 NOTE — ROUTINE PROCESS
Dual AVS reviewed with Davina Green RN. All medications reviewed individually with patient. Opportunities for questions and concerns provided. Patient discharged via (mode of transport ie. Car, ambulance or air transport) car. Patient's arm band appropriately discarded.

## 2017-08-11 NOTE — PROGRESS NOTES
Problem: Mobility Impaired (Adult and Pediatric)  Goal: *Acute Goals and Plan of Care (Insert Text)  In 1-7 days pt will be able to perform:  ST. Bed mobility: Rolling L to R to L modified independent for positioning. 2. Supine to sit to supine S with HR for meals. 3. Sit to stand to sit S with RW in prep for ambulation. LT. Gait: Ambulate >150ft S with RW, WBAT, for home/community mobility. 2. Stair Negotiation: Ascend/descend >2 steps CGA with HR for home entry. 3. Activity tolerance: Tolerate up in chair 1-2 hours for ADLs. 4. Patient/Family Education: Patient/family to be independent with HEP for follow-up care and safe discharge. Outcome: Resolved/Met Date Met:  17  PHYSICAL THERAPY TREATMENT/DISCHARGE     Patient: Tegan Wolff (89 y.o. female)  Date: 2017  Diagnosis: OSTEOARTHRITIS LEFT HIP  Osteoarthritis of left hip Osteoarthritis of left hip  Procedure(s) (LRB):  LEFT TOTAL HIP REPLACEMENT - ANTERIOR APPROACH W/C-ARM - SPEC POP (Left) 1 Day Post-Op  Precautions: Fall, WBAT  Chart, physical therapy assessment, plan of care and goals were reviewed. ASSESSMENT:  Pt has met all acute care PT goals at this time for safe return to home with HHPT. During gait training [facility] distances with RW use, supervision level assist with an antalgic/step-through gait pattern. Pt ascended and descended stairs with L handrail use, CGA/supervision level assist with a non-reciprocal pattern. Left pt in bed with all needs met, call bell within reach and ice packs to L hip. Recommend HHPT at time of discharge. Educated pt on the importance of HEP, home ambulation and home safety due to increased falls risk; pt verbalized understanding.    Progression toward goals:  [X]      Goals met  [ ]      Improving appropriately and progressing toward goals  [ ]      Improving slowly and progressing toward goals  [ ]      Not making progress toward goals and plan of care will be adjusted PLAN:  Patient will be discharged from physical therapy at this time. Rationale for discharge:  [X] Goals Achieved  [ ] Johnnie Davison  [ ] Patient not participating in therapy  [ ] Other:  Discharge Recommendations:  Home Health  Further Equipment Recommendations for Discharge:  rolling walker       SUBJECTIVE:   Patient stated I am having pain in my groin and down the front of my leg.       OBJECTIVE DATA SUMMARY:   Critical Behavior:  Neurologic State: Alert, Appropriate for age  Orientation Level: Appropriate for age, Oriented X4  Cognition: Appropriate decision making, Appropriate for age attention/concentration, Appropriate safety awareness, Follows commands  Safety/Judgement: Awareness of environment, Fall prevention, Good awareness of safety precautions, Insight into deficits  Functional Mobility Training:  Bed Mobility:   Supine to Sit: Supervision  Sit to Supine: Supervision  Scooting: Supervision   Transfers:  Sit to Stand: Supervision  Stand to Sit: Supervision  Balance:  Sitting: Intact  Standing: Intact; With support  Standing - Static: Good  Standing - Dynamic : Good  Ambulation/Gait Training:  Distance (ft): 180 Feet (ft)  Assistive Device: Walker, rolling;Gait belt  Ambulation - Level of Assistance: Supervision   Gait Abnormalities: Antalgic;Decreased step clearance   Left Side Weight Bearing: As tolerated  Base of Support: Shift to right  Stance: Left decreased;Right increased  Speed/Vika: Pace decreased (<100 feet/min)  Step Length: Right shortened;Left shortened  Swing Pattern: Right asymmetrical;Left asymmetrical   Interventions: Visual/Demos; Verbal cues; Tactile cues; Safety awareness training  Stairs:  Number of Stairs Trained: 8  Stairs - Level of Assistance: Contact guard assistance              Rail Use: Left   Therapeutic Exercises:   HEP included ankle pumps, heel slides, glute squeezes, hamstring sets, LAQ, seated marching, pillow squeezes x 10 res  Pain:  Pain Scale 1: Numeric (0 - 10)  Pain Intensity 1: 2  Pain Location 1: Hip   Activity Tolerance:   Fair  Please refer to the flowsheet for vital signs taken during this treatment.   After treatment:   [ ] Patient left in no apparent distress sitting up in chair  [X] Patient left in no apparent distress in bed  [X] Call bell left within reach  [X] Nursing notified  [ ] Caregiver present  [ ] Bed alarm activated     Diya Bustamante PT, DPT      Time Calculation: 34 mins

## 2017-08-11 NOTE — DISCHARGE INSTRUCTIONS
[address] [place] [address] Sports Medicine   Patient Discharge Instructions    Reba Parry / [id number] : 1955    Admitted 8/10/2017 Discharged: 8/10/2017     IF YOU HAVE ANY PROBLEMS ONCE YOU ARE AT HOME CALL THE FOLLOWING NUMBERS:   Main office number: [phone]    Your follow up appointment to see either Dr. Rupal Clancy, Tania Vasqeuz PA-C, or [facility] PA-C as scheduled in 2 weeks. If you are unsure of your appointment date call the office at [phone]. Medication Instructions     · Resume your home medictions as directed, you may have directed not to resume supplements until after your follow up. · A prescription for pain medication has been given   · It is important that you take the medication exactly as they are prescribed. · Keep your medication in the bottles provided by the pharmacist and keep a list of the medication names, dosages, and times to be taken in your wallet. · Do not take other medications without consulting your doctor. What to do at [address] [place], [place] [address] your prehospital diet. If you have excessive nausea or vomitting call your doctor's office. Be sure to maintain adequate fluid intake. Some pain medications may cause constipation. Remember to drink fluids, stay as active as possible, and eat plenty of fiber-rich foods. Begin In-Home Physical Therapy; 3 times a week to work on gait training, range of motion, strengthening, and weight bearing exercises as tolerable. Continue to use your walker or cane when walking. May progress from the walker to a cane to complete total bearing as tolerable. Patient may shower. Wrap incision with plastic wrap/covering to prevent incision from getting wet. Avoid complete immersion. YOUR DRESSING SHOULD BE CHANGED BY YOUR HOME HEALTH NURSE 3-5 DAYS AFTER SURGERY.           When to Call    - Call if you have a temperature greater then 101  - Unable to keep food down  - Are unable to bear any wieght   - Need a pain medication refill     Information obtained by :  I understand that if any problems occur once I am at home I am to contact my physician. I understand and acknowledge receipt of the instructions indicated above.                                                                                                                                            Physician's or R.N.'s Signature                                                                  Date/Time                                                                                                                                              Patient or Representative Signature                                                          Date/Time

## 2017-08-11 NOTE — PROGRESS NOTES
Problem: Mobility Impaired (Adult and Pediatric)  Goal: *Acute Goals and Plan of Care (Insert Text)  In 1-7 days pt will be able to perform:  ST. Bed mobility: Rolling L to R to L modified independent for positioning. 2. Supine to sit to supine S with HR for meals. 3. Sit to stand to sit S with RW in prep for ambulation. LT. Gait: Ambulate >150ft S with RW, WBAT, for home/community mobility. 2. Stair Negotiation: Ascend/descend >2 steps CGA with HR for home entry. 3. Activity tolerance: Tolerate up in chair 1-2 hours for ADLs. 4. Patient/Family Education: Patient/family to be independent with HEP for follow-up care and safe discharge. Pt refused PT due to:     [ ]  Nausea/vomiting  [ ]  Eating  [X]  Pain; Iva, RN in the room administering pain meds. [ ]  Pt lethargic  [ ]  Off Unit  Will f/u later as pt's schedule allows. Thank you.      Atiya Bustamante PT, DPT

## 2017-08-11 NOTE — PROGRESS NOTES
Progress Note        Patient: Sabrina Rolon MRN: [id number]  SSN: [id number]    YOB: 1955  Age: 65 y.o. Sex: female      1 Day Post-Op status post Procedure(s) (LRB):  LEFT TOTAL HIP REPLACEMENT - ANTERIOR APPROACH W/C-ARM - SPEC POP (Left)    Admit Date: 8/10/2017  Admit Diagnosis: OSTEOARTHRITIS LEFT HIP  Osteoarthritis of left hip    Subjective:      Doing well. No complaints. No SOB. No Chest Pain. No Nausea or Vomiting. No problems eating or voiding. Objective:        Temp (24hrs), Av °F (36.7 °C), Min:97.4 °F (36.3 °C), Max:98.4 °F (36.9 °C)    Body mass index is 33.19 kg/(m^2). Patient Vitals for the past 12 hrs:   BP Temp Pulse Resp SpO2   17 0303 107/56 98 °F (36.7 °C) 77 16 95 %   08/10/17 2312 99/42 97.9 °F (36.6 °C) 79 17 99 %     Recent Labs      17   0332   HGB  10.1*   HCT  30.3*   NA  144   K  4.2   CL  109*   CO2  28   BUN  17   CREA  0.95   GLU  93       Physical Exam:  Vital Signs are Stable. No Acute Distress. Alert and Oriented. Negative Homans sign. Toes AROM Full. Neurovascular exam is normal.    Dressing is Clean, Dry, and Intact. Assessment/Plan:     1. Stable s/p left THr  2. Continue PT/OT  3.  D/c planning      Discharge Plan: Home    Signed By: MIRZA Whaley     2017

## 2017-08-11 NOTE — ROUTINE PROCESS
Bedside and Verbal shift change report given to NORA Malik RN (oncoming nurse) by Abad Melquiades. Alessandro Mckeon RN (offgoing nurse). Report included the following information SBAR, Kardex, Intake/Output, MAR and Recent Results.

## 2017-08-11 NOTE — PROGRESS NOTES
Problem: Falls - Risk of  Goal: *Absence of Falls  Document Apple Fall Risk and appropriate interventions in the flowsheet.    Outcome: Progressing Towards Goal  Fall Risk Interventions:  Mobility Interventions: Patient to call before getting OOB, Utilize walker, cane, or other assitive device           Medication Interventions: Evaluate medications/consider consulting pharmacy, Patient to call before getting OOB, Teach patient to arise slowly     Elimination Interventions: Call light in reach, Patient to call for help with toileting needs, Toileting schedule/hourly rounds

## 2017-08-11 NOTE — ROUTINE PROCESS
Bedside and Verbal shift change report given to Jazlyn Davila RN  (oncoming nurse) by Mango Mcdonnell RN (offgoing nurse). Report included the following information SBAR, Kardex, OR Summary, Procedure Summary, Intake/Output, MAR, Accordion, Recent Results and Med Rec Status Options for questions were provided to the oncoming RN as well as the patient.

## 2017-08-11 NOTE — PROGRESS NOTES
Patient had been discharged prior to 's visit. [facility]   Board Certified   [phone] - Office

## 2017-08-12 NOTE — PROGRESS NOTES
1200 - Assumed care of patient at this time. Patient is A/O x 4. IV to left hand  intact. Teds and SCDs in place bilaterally. Mepilex dressing to left hip CDI. Denies numbness/tingling. Pain 5/10. Patient was oriented to the room to include use of call bell, meal ordering, and use of incentive spirometer. Patient was given explanation of \" up for dinner\" program and has verbalized understanding. Phone and call bell left within reach. Plan of care for the day addressed with patient. Educated on pain medication availability and possible side effects.

## 2020-01-20 ENCOUNTER — HOSPITAL ENCOUNTER (OUTPATIENT)
Dept: PREADMISSION TESTING | Age: 65
Discharge: HOME | End: 2020-01-20
Attending: ORTHOPAEDIC SURGERY
Payer: MEDICARE

## 2020-01-20 DIAGNOSIS — Z01.812 BLOOD TESTS PRIOR TO TREATMENT OR PROCEDURE: ICD-10-CM

## 2020-01-20 DIAGNOSIS — M16.11 PRIMARY OSTEOARTHRITIS OF RIGHT HIP: ICD-10-CM

## 2020-01-20 DIAGNOSIS — Z01.818 OTHER SPECIFIED PRE-OPERATIVE EXAMINATION: ICD-10-CM

## 2020-01-20 LAB
ALBUMIN SERPL-MCNC: 4 G/DL (ref 3.4–5)
ALBUMIN/GLOB SERPL: 1.3 {RATIO} (ref 0.8–1.7)
ALP SERPL-CCNC: 86 U/L (ref 45–117)
ALT SERPL-CCNC: 25 U/L (ref 13–56)
ANION GAP SERPL CALC-SCNC: 2 MMOL/L (ref 3–18)
APPEARANCE UR: CLEAR
APTT PPP: 31.2 SEC (ref 23–36.4)
AST SERPL-CCNC: 15 U/L (ref 10–38)
ATRIAL RATE: 68 BPM
BASOPHILS # BLD: 0 K/UL (ref 0–0.1)
BASOPHILS NFR BLD: 1 % (ref 0–2)
BILIRUB SERPL-MCNC: 0.3 MG/DL (ref 0.2–1)
BILIRUB UR QL: NEGATIVE
BUN SERPL-MCNC: 18 MG/DL (ref 7–18)
BUN/CREAT SERPL: 20 (ref 12–20)
CALCIUM SERPL-MCNC: 9.5 MG/DL (ref 8.5–10.1)
CALCULATED P AXIS, ECG09: 69 DEGREES
CALCULATED R AXIS, ECG10: 51 DEGREES
CALCULATED T AXIS, ECG11: 36 DEGREES
CHLORIDE SERPL-SCNC: 104 MMOL/L (ref 100–111)
CO2 SERPL-SCNC: 34 MMOL/L (ref 21–32)
COLOR UR: YELLOW
CREAT SERPL-MCNC: 0.89 MG/DL (ref 0.6–1.3)
DIAGNOSIS, 93000: NORMAL
DIFFERENTIAL METHOD BLD: ABNORMAL
EOSINOPHIL # BLD: 0.2 K/UL (ref 0–0.4)
EOSINOPHIL NFR BLD: 2 % (ref 0–5)
ERYTHROCYTE [DISTWIDTH] IN BLOOD BY AUTOMATED COUNT: 13 % (ref 11.6–14.5)
ERYTHROCYTE [SEDIMENTATION RATE] IN BLOOD: 17 MM/HR (ref 0–30)
GLOBULIN SER CALC-MCNC: 3.2 G/DL (ref 2–4)
GLUCOSE SERPL-MCNC: 89 MG/DL (ref 74–99)
GLUCOSE UR STRIP.AUTO-MCNC: NEGATIVE MG/DL
HBA1C MFR BLD: 5.3 % (ref 4.2–5.6)
HCT VFR BLD AUTO: 42.6 % (ref 35–45)
HGB BLD-MCNC: 13.9 G/DL (ref 12–16)
HGB UR QL STRIP: NEGATIVE
INR PPP: 0.9 (ref 0.8–1.2)
KETONES UR QL STRIP.AUTO: NEGATIVE MG/DL
LEUKOCYTE ESTERASE UR QL STRIP.AUTO: NEGATIVE
LYMPHOCYTES # BLD: 2.4 K/UL (ref 0.9–3.6)
LYMPHOCYTES NFR BLD: 32 % (ref 21–52)
MCH RBC QN AUTO: 32 PG (ref 24–34)
MCHC RBC AUTO-ENTMCNC: 32.6 G/DL (ref 31–37)
MCV RBC AUTO: 98.2 FL (ref 74–97)
MONOCYTES # BLD: 0.5 K/UL (ref 0.05–1.2)
MONOCYTES NFR BLD: 6 % (ref 3–10)
NEUTS SEG # BLD: 4.5 K/UL (ref 1.8–8)
NEUTS SEG NFR BLD: 59 % (ref 40–73)
NITRITE UR QL STRIP.AUTO: NEGATIVE
P-R INTERVAL, ECG05: 132 MS
PH UR STRIP: 8.5 [PH] (ref 5–8)
PLATELET # BLD AUTO: 211 K/UL (ref 135–420)
PMV BLD AUTO: 10.9 FL (ref 9.2–11.8)
POTASSIUM SERPL-SCNC: 5 MMOL/L (ref 3.5–5.5)
PROT SERPL-MCNC: 7.2 G/DL (ref 6.4–8.2)
PROT UR STRIP-MCNC: NEGATIVE MG/DL
PROTHROMBIN TIME: 11.9 SEC (ref 11.5–15.2)
Q-T INTERVAL, ECG07: 390 MS
QRS DURATION, ECG06: 84 MS
QTC CALCULATION (BEZET), ECG08: 414 MS
RBC # BLD AUTO: 4.34 M/UL (ref 4.2–5.3)
SODIUM SERPL-SCNC: 140 MMOL/L (ref 136–145)
SP GR UR REFRACTOMETRY: 1.02 (ref 1–1.03)
UROBILINOGEN UR QL STRIP.AUTO: 1 EU/DL (ref 0.2–1)
VENTRICULAR RATE, ECG03: 68 BPM
WBC # BLD AUTO: 7.6 K/UL (ref 4.6–13.2)

## 2020-01-20 PROCEDURE — 36415 COLL VENOUS BLD VENIPUNCTURE: CPT

## 2020-01-20 PROCEDURE — 81003 URINALYSIS AUTO W/O SCOPE: CPT

## 2020-01-20 PROCEDURE — 85610 PROTHROMBIN TIME: CPT

## 2020-01-20 PROCEDURE — 93005 ELECTROCARDIOGRAM TRACING: CPT

## 2020-01-20 PROCEDURE — 83036 HEMOGLOBIN GLYCOSYLATED A1C: CPT

## 2020-01-20 PROCEDURE — 85652 RBC SED RATE AUTOMATED: CPT

## 2020-01-20 PROCEDURE — 85025 COMPLETE CBC W/AUTO DIFF WBC: CPT

## 2020-01-20 PROCEDURE — 85730 THROMBOPLASTIN TIME PARTIAL: CPT

## 2020-01-20 PROCEDURE — 80053 COMPREHEN METABOLIC PANEL: CPT

## 2020-01-21 LAB
BACTERIA SPEC CULT: NORMAL
BACTERIA SPEC CULT: NORMAL
SERVICE CMNT-IMP: NORMAL

## 2020-02-06 PROBLEM — M16.11 PRIMARY LOCALIZED OSTEOARTHRITIS OF RIGHT HIP: Chronic | Status: ACTIVE | Noted: 2020-02-06

## 2020-02-06 NOTE — DISCHARGE INSTRUCTIONS
[address] [place] [address] Sports Medicine   Patient Discharge Instructions    Subha Mcleod / [id number] : 1955    Admitted (Not on file) Discharged: 2020     IF YOU HAVE ANY PROBLEMS ONCE YOU ARE  [facility]:   Main office number: [phone]    Your follow up appointment to see either Dr. Teena Macias PA-C, or [facility] as scheduled in 2 weeks. If you are unsure of your appointment date call the office at [phone]. Medication Instructions     · Resume your home medictions as directed, you may have directed not to resume supplements until after your follow up. · A prescription for pain medication has been given   · It is important that you take the medication exactly as they are prescribed. · Keep your medication in the bottles provided by the pharmacist and keep a list of the medication names, dosages, and times to be taken in your wallet. · Do not take other medications without consulting your doctor. What to do at [address] [place] [address] Ave your prehospital diet. If you have excessive nausea or vomitting call your doctor's office. Be sure to maintain adequate fluid intake. Some pain medications may cause constipation. Remember to drink fluids, stay as active as possible, and eat plenty of fiber-rich foods. Begin In-Home Physical Therapy; 3 times a week to work on gait training, range of motion, strengthening, and weight bearing exercises as tolerable. Continue to use your walker or cane when walking. May progress from the walker to a cane to complete total bearing as tolerable. Patient may shower. Wrap incision with plastic wrap/covering to prevent incision from getting wet. Avoid complete immersion. YOUR DRESSING SHOULD BE CHANGED BY YOUR HOME HEALTH NURSE 3-5 DAYS AFTER SURGERY.           When to Call    - Call if you have a temperature greater then 101  - Unable to keep food down  - Are unable to bear any wieght - Need a pain medication refill     Information obtained by :  I understand that if any problems occur once I am at home I am to contact my physician. I understand and acknowledge receipt of the instructions indicated above.                                                                                                                                            Physician's or R.N.'s Signature                                                                  Date/Time                                                                                                                                              Patient or Representative Signature                                                          Date/Time

## 2020-02-06 NOTE — H&P
[address] [facility] [address] Medicine  History and Physical Exam    Patient: Chelsea Duarte MRN: [id number]  SSN: [id number]    YOB: 1955  Age: 60 y.o. Sex: female      Subjective:      Chief Complaint: right hip pain    History of Present Illness:  Patient complains of right hip pain and difficulty ambulating, which has progressed over the past several months. X-rays showed osteoarthritis of the joint. The patient's pain has persisted and progressed despite conservative treatments and therapies. The patient has been previously treated with nsaids. The patient has at this time opted for surgical intervention.        Past Medical History:   Diagnosis Date    Anxiety disorder     Arthritis     Asthma     Attention deficit disorder (ADD)     Chronic pain     lumbar and cervical spine and joints    DDD (degenerative disc disease), cervical     DDD (degenerative disc disease), lumbar     Depression     major depression and anxiety, PTSD    Fatty liver disease, nonalcoholic     Fibromyalgia     GERD (gastroesophageal reflux disease)     Headache(784.0)     Hypertension     Ill-defined condition     Migraine headache     Neuralgia     secondary to shingles    Osteoarthritis of left hip 8/9/2017    Positive NICOLE (antinuclear antibody)     Post-menopausal     Premature ventricular contractions     occassional    Primary localized osteoarthritis of right hip 2/6/2020    PTSD (post-traumatic stress disorder)     Vitamin B12 deficiency     Vitamin D deficiency      Past Surgical History:   Procedure Laterality Date    ENDOSCOPY, COLON, DIAGNOSTIC      HX COLONOSCOPY      HX ORTHOPAEDIC Left 2017    left hip    HX WISDOM TEETH EXTRACTION       Social History     Occupational History    Not on file   Tobacco Use    Smoking status: Never Smoker    Smokeless tobacco: Never Used   Substance and Sexual Activity    Alcohol use: Not Currently    Drug use: No     Comment: prescription opiates    Sexual activity: Yes     Prior to Admission medications    Medication Sig Start Date End Date Taking? Authorizing Provider   cholecalciferol (VITAMIN D3) (5000 Units/125 mcg) tab tablet Take 5,000 Units by mouth daily. Provider, Historical   meloxicam (MOBIC) 15 mg tablet Take 15 mg by mouth daily. Provider, Historical   estradioL (ESTRACE) 0.01 % (0.1 mg/gram) vaginal cream Insert 2 g into vagina daily. Provider, Historical   losartan (COZAAR) 100 mg tablet Take 50 mg by mouth daily. Provider, Historical   morphine CR (MS CONTIN) 100 mg CR tablet Take 100 mg by mouth. 6065-5503    Provider, Historical   methylphenidate (RITALIN) 20 mg tablet Take 20 mg by mouth four (4) times daily. Provider, Historical   oxyCODONE IR (OXY-IR) 30 mg immediate release tablet Take 30 mg by mouth four (4) times daily. 0700-100-15--1900    Provider, Historical   famotidine (PEPCID) 40 mg tablet Take 40 mg by mouth daily as needed. Provider, Historical   albuterol sulfate (PROVENTIL;VENTOLIN) 2.5 mg/0.5 mL nebu nebulizer solution 2.5 mg by Nebulization route every four (4) hours as needed for Wheezing. Provider, Historical   multivitamin (ONE A DAY) tablet Take 1 Tab by mouth daily. Provider, Historical   ascorbic acid, vitamin C, (VITAMIN C) 500 mg tablet Take 1,000 mg by mouth two (2) times a day. Provider, Historical   DULCOLAX, BISACODYL, PO Take 1 Cap by mouth daily as needed. 1-3 tablets    Provider, Historical   acetaminophen (TYLENOL EXTRA STRENGTH) 500 mg tablet Take 1,000 mg by mouth every six (6) hours as needed for Pain. Provider, Historical   NALOXONE HCL (NARCAN IJ) 0.4 mg by SubCUTAneous route as needed. Provider, Historical   atenolol (TENORMIN) 100 mg tablet Take 50 mg by mouth nightly. Provider, Historical   ALPRAZolam (XANAX) 0.5 mg tablet Take 1 mg by mouth four (4) times daily as needed.  To 1 mg     Provider, Historical   cyanocobalamin (VITAMIN B12) 1,000 mcg/mL injection 10,000 mcg by IntraMUSCular route every thirty (30) days. Provider, Historical   Calcium-Cholecalciferol, D3, (CALTRATE 600 PLUS D) 600 mg(1,500mg) -400 unit chew Take 1 Tab by mouth daily. Provider, Historical   pseudoephedrine (SUDAFED) 30 mg tablet Take 60 mg by mouth every four (4) hours as needed. Provider, Historical   albuterol (PROVENTIL HFA) 90 mcg/actuation inhaler Take 2 Puffs by inhalation every six (6) hours as needed. Provider, Historical   fluticasone-salmeterol (ADVAIR DISKUS) 250-50 mcg/dose diskus inhaler Take 1 Puff by inhalation every twelve (12) hours. Provider, Historical   valACYclovir (VALTREX) 500 mg tablet Take  by mouth two (2) times daily as needed. Provider, Historical       Allergies: Allergies   Allergen Reactions    Other Food Rash and Itching     Black beans    Adhesive Rash and Itching     Per Nurse Fely Persaud RN at 3:57 states patient is not allergic to [facility] but the adhesive part of patch.  Cymbalta [Duloxetine] Other (comments)     Tachycardia, fever, H/A and HTN    Keflex [Cephalexin] Rash    Lisinopril Cough    Pcn [Penicillins] Hives    Butrans [Buprenorphine] Rash and Itching     Patient states the allergy is only to the [facility] and reaction is at the site        Review of Systems:  A comprehensive review of systems was negative except for that written in the History of Present Illness. Objective:       Physical Exam:  HEENT: Normocephalic, atraumatic  Lungs:  Clear to auscultation  Heart:   Regular rate and rhythm  Abdomen: Soft  Extremities:  Pain with range of motion of the right hip. Passive flexion  degrees,                       passive internal rotation 0-10 degrees, with pain throughout ROM,                        passive external rotation 10-20 degrees with pain at the arc of motion. Antalgic gait noted. Assessment:      Arthritis of the right hip.     Plan:       Proceed with scheduled RIGHT TOTAL HIP ARTHROPLASTY. The various methods of treatment have been discussed with the patient and family. After consideration of risks, benefits, and other options for treatment, the patient has consented to surgical interventions. Questions were answered and preoperative teaching was done by Dr Juany Stevenson.      Signed By: MIRZA Chandra     February 6, 2020

## 2020-02-12 ENCOUNTER — ANESTHESIA EVENT (OUTPATIENT)
Dept: SURGERY | Age: 65
End: 2020-02-12
Payer: MEDICARE

## 2020-02-13 ENCOUNTER — APPOINTMENT (OUTPATIENT)
Dept: GENERAL RADIOLOGY | Age: 65
End: 2020-02-13
Attending: ORTHOPAEDIC SURGERY
Payer: MEDICARE

## 2020-02-13 ENCOUNTER — ANESTHESIA (OUTPATIENT)
Dept: SURGERY | Age: 65
End: 2020-02-13
Payer: MEDICARE

## 2020-02-13 ENCOUNTER — HOSPITAL ENCOUNTER (OUTPATIENT)
Age: 65
Discharge: HOME HEALTH | End: 2020-02-14
Attending: ORTHOPAEDIC SURGERY | Admitting: ORTHOPAEDIC SURGERY
Payer: MEDICARE

## 2020-02-13 ENCOUNTER — APPOINTMENT (OUTPATIENT)
Dept: GENERAL RADIOLOGY | Age: 65
End: 2020-02-13
Attending: PHYSICIAN ASSISTANT
Payer: MEDICARE

## 2020-02-13 LAB
ABO + RH BLD: NORMAL
BLOOD GROUP ANTIBODIES SERPL: NORMAL
SPECIMEN EXP DATE BLD: NORMAL

## 2020-02-13 PROCEDURE — 73501 X-RAY EXAM HIP UNI 1 VIEW: CPT

## 2020-02-13 PROCEDURE — 36415 COLL VENOUS BLD VENIPUNCTURE: CPT

## 2020-02-13 PROCEDURE — 65270000029 HC RM PRIVATE

## 2020-02-13 PROCEDURE — 86900 BLOOD TYPING SEROLOGIC ABO: CPT

## 2020-02-13 PROCEDURE — 76060000033 HC ANESTHESIA 1 TO 1.5 HR: Performed by: ORTHOPAEDIC SURGERY

## 2020-02-13 PROCEDURE — 74011250636 HC RX REV CODE- 250/636: Performed by: PHYSICIAN ASSISTANT

## 2020-02-13 PROCEDURE — C1776 JOINT DEVICE (IMPLANTABLE): HCPCS | Performed by: ORTHOPAEDIC SURGERY

## 2020-02-13 PROCEDURE — 77030038010: Performed by: ORTHOPAEDIC SURGERY

## 2020-02-13 PROCEDURE — 77030027138 HC INCENT SPIROMETER -A: Performed by: ORTHOPAEDIC SURGERY

## 2020-02-13 PROCEDURE — 76010000149 HC OR TIME 1 TO 1.5 HR: Performed by: ORTHOPAEDIC SURGERY

## 2020-02-13 PROCEDURE — 77030031139 HC SUT VCRL2 J&J -A: Performed by: ORTHOPAEDIC SURGERY

## 2020-02-13 PROCEDURE — 77030037713 HC CLOSR DEV INCIS ZIP STRY -B: Performed by: ORTHOPAEDIC SURGERY

## 2020-02-13 PROCEDURE — 77030013708 HC HNDPC SUC IRR PULS STRY –B: Performed by: ORTHOPAEDIC SURGERY

## 2020-02-13 PROCEDURE — 76210000016 HC OR PH I REC 1 TO 1.5 HR: Performed by: ORTHOPAEDIC SURGERY

## 2020-02-13 PROCEDURE — 77030003666 HC NDL SPINAL BD -A: Performed by: ORTHOPAEDIC SURGERY

## 2020-02-13 PROCEDURE — 74011250636 HC RX REV CODE- 250/636: Performed by: ORTHOPAEDIC SURGERY

## 2020-02-13 PROCEDURE — 74011250636 HC RX REV CODE- 250/636: Performed by: SPECIALIST

## 2020-02-13 PROCEDURE — 74011250637 HC RX REV CODE- 250/637: Performed by: PHYSICIAN ASSISTANT

## 2020-02-13 PROCEDURE — 74011000250 HC RX REV CODE- 250: Performed by: SPECIALIST

## 2020-02-13 PROCEDURE — 77030040361 HC SLV COMPR DVT MDII -B: Performed by: ORTHOPAEDIC SURGERY

## 2020-02-13 PROCEDURE — 77030033263 HC DRSG MEPILEX 16-48IN BORD MOLN -B: Performed by: ORTHOPAEDIC SURGERY

## 2020-02-13 PROCEDURE — 74011250636 HC RX REV CODE- 250/636: Performed by: NURSE ANESTHETIST, CERTIFIED REGISTERED

## 2020-02-13 PROCEDURE — 74011000250 HC RX REV CODE- 250: Performed by: NURSE ANESTHETIST, CERTIFIED REGISTERED

## 2020-02-13 PROCEDURE — 77030034694 HC SCPL CANADY PLSM DISP USMD -E: Performed by: ORTHOPAEDIC SURGERY

## 2020-02-13 PROCEDURE — 77030018836 HC SOL IRR NACL ICUM -A: Performed by: ORTHOPAEDIC SURGERY

## 2020-02-13 PROCEDURE — 77030020782 HC GWN BAIR PAWS FLX 3M -B: Performed by: ORTHOPAEDIC SURGERY

## 2020-02-13 PROCEDURE — 74011250637 HC RX REV CODE- 250/637: Performed by: SPECIALIST

## 2020-02-13 PROCEDURE — 77030014144 HC TY SPN ANES BBMI -B: Performed by: SPECIALIST

## 2020-02-13 PROCEDURE — 65390000012 HC CONDITION CODE 44 OBSERVATION

## 2020-02-13 DEVICE — SIZE 10 STD COLLAR
Type: IMPLANTABLE DEVICE | Site: HIP | Status: FUNCTIONAL
Brand: ENTRADA HIP STEM

## 2020-02-13 DEVICE — STEM FEM PRSS FT HIP HRD SURF: Type: IMPLANTABLE DEVICE | Site: HIP | Status: FUNCTIONAL

## 2020-02-13 DEVICE — FEMORAL HEAD 32-12/14+0
Type: IMPLANTABLE DEVICE | Site: HIP | Status: FUNCTIONAL
Brand: DELTA CERAMIC FEMORAL HEAD

## 2020-02-13 DEVICE — THREE HOLE, 48 MM
Type: IMPLANTABLE DEVICE | Site: HIP | Status: FUNCTIONAL
Brand: LEGEND ACETABULAR SHELL

## 2020-02-13 DEVICE — ACETABULAR LINER NEUTRAL 32/48
Type: IMPLANTABLE DEVICE | Site: HIP | Status: FUNCTIONAL
Brand: LEGEND

## 2020-02-13 RX ORDER — LOSARTAN POTASSIUM 50 MG/1
50 TABLET ORAL DAILY
Status: DISCONTINUED | OUTPATIENT
Start: 2020-02-14 | End: 2020-02-14 | Stop reason: HOSPADM

## 2020-02-13 RX ORDER — POLYETHYLENE GLYCOL 3350 17 G/17G
17 POWDER, FOR SOLUTION ORAL DAILY
COMMUNITY

## 2020-02-13 RX ORDER — BISACODYL 5 MG
5 TABLET, DELAYED RELEASE (ENTERIC COATED) ORAL
Status: DISCONTINUED | OUTPATIENT
Start: 2020-02-13 | End: 2020-02-14 | Stop reason: HOSPADM

## 2020-02-13 RX ORDER — DEXTROSE MONOHYDRATE 100 MG/ML
125-250 INJECTION, SOLUTION INTRAVENOUS AS NEEDED
Status: DISCONTINUED | OUTPATIENT
Start: 2020-02-13 | End: 2020-02-13 | Stop reason: HOSPADM

## 2020-02-13 RX ORDER — PANTOPRAZOLE SODIUM 40 MG/1
40 TABLET, DELAYED RELEASE ORAL DAILY
Status: DISCONTINUED | OUTPATIENT
Start: 2020-02-13 | End: 2020-02-14 | Stop reason: HOSPADM

## 2020-02-13 RX ORDER — CEFAZOLIN SODIUM 2 G/50ML
2 SOLUTION INTRAVENOUS ONCE
Status: COMPLETED | OUTPATIENT
Start: 2020-02-13 | End: 2020-02-13

## 2020-02-13 RX ORDER — SODIUM CHLORIDE 9 MG/ML
300 INJECTION, SOLUTION INTRAVENOUS CONTINUOUS
Status: DISPENSED | OUTPATIENT
Start: 2020-02-13 | End: 2020-02-13

## 2020-02-13 RX ORDER — KETOROLAC TROMETHAMINE 30 MG/ML
30 INJECTION, SOLUTION INTRAMUSCULAR; INTRAVENOUS
Status: COMPLETED | OUTPATIENT
Start: 2020-02-13 | End: 2020-02-13

## 2020-02-13 RX ORDER — MIDAZOLAM HYDROCHLORIDE 1 MG/ML
INJECTION, SOLUTION INTRAMUSCULAR; INTRAVENOUS AS NEEDED
Status: DISCONTINUED | OUTPATIENT
Start: 2020-02-13 | End: 2020-02-13 | Stop reason: HOSPADM

## 2020-02-13 RX ORDER — LIDOCAINE HYDROCHLORIDE 10 MG/ML
INJECTION, SOLUTION EPIDURAL; INFILTRATION; INTRACAUDAL; PERINEURAL
Status: COMPLETED | OUTPATIENT
Start: 2020-02-13 | End: 2020-02-13

## 2020-02-13 RX ORDER — SODIUM CHLORIDE 0.9 % (FLUSH) 0.9 %
5-40 SYRINGE (ML) INJECTION EVERY 8 HOURS
Status: DISCONTINUED | OUTPATIENT
Start: 2020-02-13 | End: 2020-02-14 | Stop reason: HOSPADM

## 2020-02-13 RX ORDER — GUAIFENESIN 100 MG/5ML
81 LIQUID (ML) ORAL 2 TIMES DAILY
Qty: 42 TAB | Refills: 0 | Status: SHIPPED | OUTPATIENT
Start: 2020-02-13 | End: 2020-03-05

## 2020-02-13 RX ORDER — FENTANYL CITRATE 50 UG/ML
50 INJECTION, SOLUTION INTRAMUSCULAR; INTRAVENOUS
Status: DISCONTINUED | OUTPATIENT
Start: 2020-02-13 | End: 2020-02-13 | Stop reason: HOSPADM

## 2020-02-13 RX ORDER — EPHEDRINE SULFATE/0.9% NACL/PF 50 MG/5 ML
SYRINGE (ML) INTRAVENOUS AS NEEDED
Status: DISCONTINUED | OUTPATIENT
Start: 2020-02-13 | End: 2020-02-13 | Stop reason: HOSPADM

## 2020-02-13 RX ORDER — ALBUTEROL SULFATE 2.5 MG/.5ML
2.5 SOLUTION RESPIRATORY (INHALATION)
Status: DISCONTINUED | OUTPATIENT
Start: 2020-02-13 | End: 2020-02-14 | Stop reason: HOSPADM

## 2020-02-13 RX ORDER — SODIUM CHLORIDE, SODIUM LACTATE, POTASSIUM CHLORIDE, CALCIUM CHLORIDE 600; 310; 30; 20 MG/100ML; MG/100ML; MG/100ML; MG/100ML
125 INJECTION, SOLUTION INTRAVENOUS CONTINUOUS
Status: DISCONTINUED | OUTPATIENT
Start: 2020-02-13 | End: 2020-02-14 | Stop reason: HOSPADM

## 2020-02-13 RX ORDER — METHYLPHENIDATE HYDROCHLORIDE 10 MG/1
20 TABLET ORAL 4 TIMES DAILY
Status: DISCONTINUED | OUTPATIENT
Start: 2020-02-13 | End: 2020-02-14 | Stop reason: HOSPADM

## 2020-02-13 RX ORDER — SODIUM CHLORIDE, SODIUM LACTATE, POTASSIUM CHLORIDE, CALCIUM CHLORIDE 600; 310; 30; 20 MG/100ML; MG/100ML; MG/100ML; MG/100ML
100 INJECTION, SOLUTION INTRAVENOUS CONTINUOUS
Status: DISCONTINUED | OUTPATIENT
Start: 2020-02-13 | End: 2020-02-13 | Stop reason: HOSPADM

## 2020-02-13 RX ORDER — ACETAMINOPHEN 500 MG
1000 TABLET ORAL ONCE
Status: COMPLETED | OUTPATIENT
Start: 2020-02-13 | End: 2020-02-13

## 2020-02-13 RX ORDER — SODIUM CHLORIDE 0.9 % (FLUSH) 0.9 %
5-40 SYRINGE (ML) INJECTION AS NEEDED
Status: DISCONTINUED | OUTPATIENT
Start: 2020-02-13 | End: 2020-02-13 | Stop reason: HOSPADM

## 2020-02-13 RX ORDER — PSEUDOEPHEDRINE HCL 30 MG
60 TABLET ORAL
Status: DISCONTINUED | OUTPATIENT
Start: 2020-02-13 | End: 2020-02-14 | Stop reason: HOSPADM

## 2020-02-13 RX ORDER — LANOLIN ALCOHOL/MO/W.PET/CERES
1 CREAM (GRAM) TOPICAL 3 TIMES DAILY
Status: DISCONTINUED | OUTPATIENT
Start: 2020-02-13 | End: 2020-02-14 | Stop reason: HOSPADM

## 2020-02-13 RX ORDER — GUAIFENESIN 100 MG/5ML
81 LIQUID (ML) ORAL 2 TIMES DAILY
Qty: 42 TAB | Refills: 0 | Status: SHIPPED | OUTPATIENT
Start: 2020-02-13 | End: 2020-02-13 | Stop reason: SDUPTHER

## 2020-02-13 RX ORDER — NALOXONE HYDROCHLORIDE 0.4 MG/ML
0.4 INJECTION, SOLUTION INTRAMUSCULAR; INTRAVENOUS; SUBCUTANEOUS AS NEEDED
Status: DISCONTINUED | OUTPATIENT
Start: 2020-02-13 | End: 2020-02-14 | Stop reason: HOSPADM

## 2020-02-13 RX ORDER — CELECOXIB 100 MG/1
400 CAPSULE ORAL ONCE
Status: COMPLETED | OUTPATIENT
Start: 2020-02-13 | End: 2020-02-13

## 2020-02-13 RX ORDER — SODIUM CHLORIDE 9 MG/ML
125 INJECTION, SOLUTION INTRAVENOUS CONTINUOUS
Status: DISCONTINUED | OUTPATIENT
Start: 2020-02-13 | End: 2020-02-14 | Stop reason: HOSPADM

## 2020-02-13 RX ORDER — ASPIRIN 325 MG
325 TABLET ORAL
COMMUNITY
End: 2020-02-14

## 2020-02-13 RX ORDER — KETAMINE HCL IN 0.9 % NACL 50 MG/5 ML
SYRINGE (ML) INTRAVENOUS AS NEEDED
Status: DISCONTINUED | OUTPATIENT
Start: 2020-02-13 | End: 2020-02-13 | Stop reason: HOSPADM

## 2020-02-13 RX ORDER — OXYCODONE AND ACETAMINOPHEN 5; 325 MG/1; MG/1
1 TABLET ORAL AS NEEDED
Status: DISCONTINUED | OUTPATIENT
Start: 2020-02-13 | End: 2020-02-13 | Stop reason: HOSPADM

## 2020-02-13 RX ORDER — ZOLPIDEM TARTRATE 5 MG/1
5-10 TABLET ORAL
Status: DISCONTINUED | OUTPATIENT
Start: 2020-02-13 | End: 2020-02-14 | Stop reason: HOSPADM

## 2020-02-13 RX ORDER — SODIUM CHLORIDE 0.9 % (FLUSH) 0.9 %
5-40 SYRINGE (ML) INJECTION EVERY 8 HOURS
Status: DISCONTINUED | OUTPATIENT
Start: 2020-02-13 | End: 2020-02-13 | Stop reason: HOSPADM

## 2020-02-13 RX ORDER — CEFAZOLIN SODIUM 2 G/50ML
2 SOLUTION INTRAVENOUS EVERY 8 HOURS
Status: COMPLETED | OUTPATIENT
Start: 2020-02-13 | End: 2020-02-14

## 2020-02-13 RX ORDER — OXYCODONE HYDROCHLORIDE 5 MG/1
30 TABLET ORAL 4 TIMES DAILY
Status: DISCONTINUED | OUTPATIENT
Start: 2020-02-13 | End: 2020-02-14 | Stop reason: HOSPADM

## 2020-02-13 RX ORDER — MORPHINE SULFATE 100 MG/1
100 TABLET, FILM COATED, EXTENDED RELEASE ORAL EVERY 12 HOURS
Status: DISCONTINUED | OUTPATIENT
Start: 2020-02-13 | End: 2020-02-14 | Stop reason: HOSPADM

## 2020-02-13 RX ORDER — KETOROLAC TROMETHAMINE 30 MG/ML
15 INJECTION, SOLUTION INTRAMUSCULAR; INTRAVENOUS EVERY 6 HOURS
Status: DISCONTINUED | OUTPATIENT
Start: 2020-02-13 | End: 2020-02-14 | Stop reason: HOSPADM

## 2020-02-13 RX ORDER — PROPOFOL 10 MG/ML
VIAL (ML) INTRAVENOUS
Status: DISCONTINUED | OUTPATIENT
Start: 2020-02-13 | End: 2020-02-13 | Stop reason: HOSPADM

## 2020-02-13 RX ORDER — FENTANYL CITRATE 50 UG/ML
25 INJECTION, SOLUTION INTRAMUSCULAR; INTRAVENOUS AS NEEDED
Status: DISCONTINUED | OUTPATIENT
Start: 2020-02-13 | End: 2020-02-13 | Stop reason: HOSPADM

## 2020-02-13 RX ORDER — DIPHENHYDRAMINE HYDROCHLORIDE 50 MG/ML
12.5 INJECTION, SOLUTION INTRAMUSCULAR; INTRAVENOUS
Status: DISCONTINUED | OUTPATIENT
Start: 2020-02-13 | End: 2020-02-14 | Stop reason: HOSPADM

## 2020-02-13 RX ORDER — METOCLOPRAMIDE HYDROCHLORIDE 5 MG/ML
10 INJECTION INTRAMUSCULAR; INTRAVENOUS
Status: DISCONTINUED | OUTPATIENT
Start: 2020-02-13 | End: 2020-02-14 | Stop reason: HOSPADM

## 2020-02-13 RX ORDER — MAGNESIUM SULFATE 100 %
4 CRYSTALS MISCELLANEOUS AS NEEDED
Status: DISCONTINUED | OUTPATIENT
Start: 2020-02-13 | End: 2020-02-13 | Stop reason: HOSPADM

## 2020-02-13 RX ORDER — DOCUSATE SODIUM 100 MG/1
100 CAPSULE, LIQUID FILLED ORAL 2 TIMES DAILY
Status: DISCONTINUED | OUTPATIENT
Start: 2020-02-13 | End: 2020-02-14 | Stop reason: HOSPADM

## 2020-02-13 RX ORDER — ASPIRIN 81 MG/1
81 TABLET ORAL 2 TIMES DAILY
Status: DISCONTINUED | OUTPATIENT
Start: 2020-02-13 | End: 2020-02-14 | Stop reason: HOSPADM

## 2020-02-13 RX ORDER — FAMOTIDINE 20 MG/1
40 TABLET, FILM COATED ORAL
Status: DISCONTINUED | OUTPATIENT
Start: 2020-02-13 | End: 2020-02-14 | Stop reason: HOSPADM

## 2020-02-13 RX ORDER — ALPRAZOLAM 0.5 MG/1
1 TABLET ORAL
Status: DISCONTINUED | OUTPATIENT
Start: 2020-02-13 | End: 2020-02-14 | Stop reason: HOSPADM

## 2020-02-13 RX ORDER — ATENOLOL 50 MG/1
50 TABLET ORAL
Status: DISCONTINUED | OUTPATIENT
Start: 2020-02-13 | End: 2020-02-14 | Stop reason: HOSPADM

## 2020-02-13 RX ORDER — VALACYCLOVIR HYDROCHLORIDE 500 MG/1
500 TABLET, FILM COATED ORAL
Status: DISCONTINUED | OUTPATIENT
Start: 2020-02-13 | End: 2020-02-14 | Stop reason: HOSPADM

## 2020-02-13 RX ORDER — ONDANSETRON 2 MG/ML
4 INJECTION INTRAMUSCULAR; INTRAVENOUS
Status: DISCONTINUED | OUTPATIENT
Start: 2020-02-13 | End: 2020-02-14 | Stop reason: HOSPADM

## 2020-02-13 RX ORDER — ACETAMINOPHEN 325 MG/1
650 TABLET ORAL EVERY 6 HOURS
Status: DISCONTINUED | OUTPATIENT
Start: 2020-02-13 | End: 2020-02-14 | Stop reason: HOSPADM

## 2020-02-13 RX ORDER — SODIUM CHLORIDE 0.9 % (FLUSH) 0.9 %
5-40 SYRINGE (ML) INJECTION AS NEEDED
Status: DISCONTINUED | OUTPATIENT
Start: 2020-02-13 | End: 2020-02-14 | Stop reason: HOSPADM

## 2020-02-13 RX ORDER — ONDANSETRON 2 MG/ML
4 INJECTION INTRAMUSCULAR; INTRAVENOUS ONCE
Status: DISCONTINUED | OUTPATIENT
Start: 2020-02-13 | End: 2020-02-13 | Stop reason: HOSPADM

## 2020-02-13 RX ORDER — HYDROMORPHONE HYDROCHLORIDE 2 MG/ML
0.5 INJECTION, SOLUTION INTRAMUSCULAR; INTRAVENOUS; SUBCUTANEOUS
Status: COMPLETED | OUTPATIENT
Start: 2020-02-13 | End: 2020-02-13

## 2020-02-13 RX ORDER — DEXAMETHASONE SODIUM PHOSPHATE 4 MG/ML
8 INJECTION, SOLUTION INTRA-ARTICULAR; INTRALESIONAL; INTRAMUSCULAR; INTRAVENOUS; SOFT TISSUE ONCE
Status: COMPLETED | OUTPATIENT
Start: 2020-02-13 | End: 2020-02-13

## 2020-02-13 RX ORDER — TRANEXAMIC ACID 650 1/1
1950 TABLET ORAL ONCE
Status: COMPLETED | OUTPATIENT
Start: 2020-02-13 | End: 2020-02-13

## 2020-02-13 RX ORDER — ALBUTEROL SULFATE 90 UG/1
2 AEROSOL, METERED RESPIRATORY (INHALATION)
Status: DISCONTINUED | OUTPATIENT
Start: 2020-02-13 | End: 2020-02-13

## 2020-02-13 RX ORDER — PREGABALIN 25 MG/1
25 CAPSULE ORAL ONCE
Status: COMPLETED | OUTPATIENT
Start: 2020-02-13 | End: 2020-02-13

## 2020-02-13 RX ADMIN — Medication 10 MG: at 14:26

## 2020-02-13 RX ADMIN — ASPIRIN 81 MG: 81 TABLET, COATED ORAL at 21:33

## 2020-02-13 RX ADMIN — KETOROLAC TROMETHAMINE 30 MG: 30 INJECTION, SOLUTION INTRAMUSCULAR at 17:45

## 2020-02-13 RX ADMIN — CELECOXIB 400 MG: 100 CAPSULE ORAL at 12:27

## 2020-02-13 RX ADMIN — MORPHINE SULFATE 100 MG: 100 TABLET, EXTENDED RELEASE ORAL at 21:33

## 2020-02-13 RX ADMIN — PROPOFOL 50 MCG/KG/MIN: 10 INJECTION, EMULSION INTRAVENOUS at 14:32

## 2020-02-13 RX ADMIN — SODIUM CHLORIDE 125 ML/HR: 900 INJECTION, SOLUTION INTRAVENOUS at 23:22

## 2020-02-13 RX ADMIN — SODIUM CHLORIDE 300 ML/HR: 900 INJECTION, SOLUTION INTRAVENOUS at 19:16

## 2020-02-13 RX ADMIN — ACETAMINOPHEN 1000 MG: 500 TABLET ORAL at 12:26

## 2020-02-13 RX ADMIN — PREGABALIN 25 MG: 25 CAPSULE ORAL at 12:27

## 2020-02-13 RX ADMIN — SODIUM CHLORIDE, SODIUM LACTATE, POTASSIUM CHLORIDE, AND CALCIUM CHLORIDE 125 ML/HR: 600; 310; 30; 20 INJECTION, SOLUTION INTRAVENOUS at 15:43

## 2020-02-13 RX ADMIN — FENTANYL CITRATE 50 MCG: 50 INJECTION, SOLUTION INTRAMUSCULAR; INTRAVENOUS at 17:47

## 2020-02-13 RX ADMIN — LIDOCAINE HYDROCHLORIDE 5 MG: 10 INJECTION, SOLUTION EPIDURAL; INFILTRATION; INTRACAUDAL; PERINEURAL at 14:26

## 2020-02-13 RX ADMIN — SODIUM CHLORIDE, SODIUM LACTATE, POTASSIUM CHLORIDE, AND CALCIUM CHLORIDE 1000 ML: 600; 310; 30; 20 INJECTION, SOLUTION INTRAVENOUS at 12:17

## 2020-02-13 RX ADMIN — MIDAZOLAM 2 MG: 1 INJECTION INTRAMUSCULAR; INTRAVENOUS at 14:10

## 2020-02-13 RX ADMIN — HYDROMORPHONE HYDROCHLORIDE 0.5 MG: 2 INJECTION, SOLUTION INTRAMUSCULAR; INTRAVENOUS; SUBCUTANEOUS at 18:14

## 2020-02-13 RX ADMIN — DEXAMETHASONE SODIUM PHOSPHATE 8 MG: 4 INJECTION, SOLUTION INTRAMUSCULAR; INTRAVENOUS at 14:12

## 2020-02-13 RX ADMIN — ACETAMINOPHEN 650 MG: 325 TABLET ORAL at 19:12

## 2020-02-13 RX ADMIN — Medication 10 MG: at 14:20

## 2020-02-13 RX ADMIN — DOCUSATE SODIUM 100 MG: 100 CAPSULE, LIQUID FILLED ORAL at 21:33

## 2020-02-13 RX ADMIN — CEFAZOLIN SODIUM 2 G: 2 SOLUTION INTRAVENOUS at 14:12

## 2020-02-13 RX ADMIN — PANTOPRAZOLE SODIUM 40 MG: 40 TABLET, DELAYED RELEASE ORAL at 12:27

## 2020-02-13 RX ADMIN — FERROUS SULFATE TAB 325 MG (65 MG ELEMENTAL FE) 325 MG: 325 (65 FE) TAB at 21:33

## 2020-02-13 RX ADMIN — Medication 10 MG: at 15:24

## 2020-02-13 RX ADMIN — HYDROMORPHONE HYDROCHLORIDE 0.5 MG: 2 INJECTION, SOLUTION INTRAMUSCULAR; INTRAVENOUS; SUBCUTANEOUS at 18:01

## 2020-02-13 RX ADMIN — SODIUM CHLORIDE, SODIUM LACTATE, POTASSIUM CHLORIDE, AND CALCIUM CHLORIDE 125 ML/HR: 600; 310; 30; 20 INJECTION, SOLUTION INTRAVENOUS at 12:17

## 2020-02-13 RX ADMIN — Medication 10 MG: at 14:40

## 2020-02-13 RX ADMIN — Medication 10 MG: at 14:15

## 2020-02-13 RX ADMIN — OXYCODONE 30 MG: 5 TABLET ORAL at 19:12

## 2020-02-13 RX ADMIN — TRANEXAMIC ACID 1950 MG: 650 TABLET ORAL at 12:18

## 2020-02-13 RX ADMIN — SODIUM CHLORIDE, SODIUM LACTATE, POTASSIUM CHLORIDE, AND CALCIUM CHLORIDE 125 ML/HR: 600; 310; 30; 20 INJECTION, SOLUTION INTRAVENOUS at 17:49

## 2020-02-13 RX ADMIN — Medication 10 MG: at 14:32

## 2020-02-13 RX ADMIN — ATENOLOL 50 MG: 50 TABLET ORAL at 21:33

## 2020-02-13 RX ADMIN — CEFAZOLIN SODIUM 2 G: 2 SOLUTION INTRAVENOUS at 21:33

## 2020-02-13 NOTE — PROGRESS NOTES
Tylor Diadema 1903 client from PACU in satisfactory condition. Client is a pt of Dr. Janna Marcial. Pt had a Right Total Hip Replacement today. Client is A/O X 4. Client is calm and cooperative. Clients PERRLA. Denies numbness or tingling to any extremity. With + Posterior Tibial and Dorsalis Pedis pulses. Capillary Refill less than 3 seconds. Skin is warm , dry and skin color is appropriate to race. Client is negativefor JVD. Bibasilar breath sounds clear. No use of accessory muscles. Bowel sounds hypoactive to all quadrants. Abdomen is soft and non-tender. Client has already voided in PACU. Client has a Mepilex silver dressing to the right Hip. Dressing is dry and intact. No other skin integrity issues present. Alexander hose applied to both legs. Sequential compression device applied. Client has RFA18 gauge PIV present and running NSS at 300 ml/hr. Clients pain is 9/10 on 0-10 scale. Client oriented to call bell use as well as bed use. Client oriented to phone and how to order meals. Call bell within reach. Bed in low position. Three side rails up. Armbands/ fall risk band intact. Dual skin check done with Ketan Mike RN. No skin breakdown noted. 1912  Pt medicated with Oxycodone 30 mg po for pain level 9/10.

## 2020-02-13 NOTE — OP NOTES
[address] [address] Medicine  Total Hip Arthroplasty      Patient: Agnes Garland MRN: [id number]  SSN: [id number]    YOB: 1955  Age: 60 y.o. Sex: female      Date of Surgery: 2/13/2020   Preoperative Diagnosis: UNILATERAL PRIMARY OSTEOARTHRITIS RIGHT HIP   Postoperative Diagnosis: UNILATERAL PRIMARY OSTEOARTHRITIS RIGHT HIP   Location: [facility]  Surgeon: Rodney Duong MD  Assistant: Calin Banerjee PA-C    Anesthesia: general    Procedure: Total Right Hip Arthroplasty    Findings: Degenerative joint disease of the right hip. Estimated Blood Loss: 300ml    Specimens: None    Complications: none    Implants:   Implant Name Type Inv. Item Serial No.  Lot No. LRB No. Used Action   LINER ACET NEUT 32/48MM -- LEGEND - [id number]  LINER ACET NEUT 32/48MM -- LEGEND  [employer]TH[employer] [id number] Right 1 Implanted   SHELL ACET 3H 48MM -- LEGEND - [id number]  SHELL ACET 3H 48MM -- Reesa [place] [employer] [id number] Right 1 Implanted   STEM HIP CLLRD SZ10 STD -- ENTRADA - [id number]  STEM HIP CLLRD SZ10 STD -- ENTRADA  ORTH[employer] [id number] Right 1 Implanted   HEAD FEM 32-12/14+0MM -- DELTA - [id number]  HEAD FEM 32-12/14+0MM -- Jannell [employer] [id number] Right 1 Implanted       Procedure Detail:  After the patient was brought to the operating suite, She was effectively anesthetized using general anesthesia, then transferred to the [place] table and secured in a standard fashion. Her right hip was then prepped and draped in a normal sterile orthopedic fashion. She was given appropriate intravenous antibiotics preoperatively. After a proper timeout was performed, a direct anterior approach to the hip was performed using a short Bolden-Jean interval. Anterior capsulotomy was performed. The degenerative changes of the hip were noted. Femoral neck osteotomy was then performed to the templated area. The head and neck were removed.  The pulvinar and labrum were excised. The acetabulum was then reamed up to 48 mm with good bleeding cancellous bone obtained. The cup was then irrigated with pulse lavage system. A 48 mm orthodevelopment Legend cup was then impacted in place with excellent stable fixation obtained, placing the cup at about 45 degrees of abduction, 20 degrees of anteversion. The liner was then impacted in place. A screw was not placed. Attention was turned to the femur, which was delivered into the wound with a combination of extension, external rotation, and adduction, and using the hook on the [place] table to deliver the femur into the wound. The canal was broached up to a size 10 for the Entrada stem system with excellent stable fixation obtained. A trial reduction was then performed with the standard neck offset and 32 mm head balls with various neck lengths. With the +0, she appeared to have equalization of leg lengths and restoration of offset radiographically, and excellent functional stability was noted. The trial broach was removed. The canal was irrigated with the pulse lavage system. The final components were impacted in place with excellent stable fixation obtained once again. The final reduction was performed and once again leg lengths and offset were restored radiographically, using the C-arm radiographically intraoperatively, and excellent functional stability was noted. The wound was then irrigated one more time, and then closed in layers. The fascia of the tensor was closed with #1 Vicryl in a running type stitch. Subcutaneous tissue was closed with 2-0 Vicryl in a simple buried stitch, and the skin was closed with Prineo. Dry, sterile dressing was then applied. She tolerated this well, was transferred to the bed, and taken to recovery room, extubated, in stable condition. All sponge and needle counts were correct.     Signed By: Inna Westbrook MD     February 13, 2020

## 2020-02-13 NOTE — DISCHARGE SUMMARY
[address] [address]     DISCHARGE SUMMARY     PATIENT: Qi Weinstein     MRN: [id number]   ADMIT DATE: 2020   [facility]   DISCHARGE DATE:      ATTENDING: Carmella Isbell MD   DICTATING: MIRZA Mo     ADMISSION DIAGNOSIS: UNILATERAL PRIMARY OSTEOARTHRITIS RIGHT HIP    DISCHARGE DIAGNOSIS: Status post RIGHT TOTAL HIP ARTHROPLASTY    HISTORY OF PRESENT ILLNESS: The patient is a 60y.o. year-old female   with ongoing right hip pain secondary to osteoarthritis of right hip. The patient's pain has persisted and progressed despite conservative treatments and therapies. The patient has at this time opted for surgical intervention.     PAST MEDICAL HISTORY:   Past Medical History:   Diagnosis Date    Anxiety disorder     Arthritis     Asthma     Attention deficit disorder (ADD)     Chronic pain     lumbar and cervical spine and joints    DDD (degenerative disc disease), cervical     DDD (degenerative disc disease), lumbar     Depression     major depression and anxiety, PTSD    Fatty liver disease, nonalcoholic     Fibromyalgia     GERD (gastroesophageal reflux disease)     Headache(784.0)     Hypertension     Ill-defined condition     Migraine headache     Neuralgia     secondary to shingles    Osteoarthritis of left hip 2017    Positive NICOLE (antinuclear antibody)     Post-menopausal     Premature ventricular contractions     occassional    Primary localized osteoarthritis of right hip 2020    PTSD (post-traumatic stress disorder)     Vitamin B12 deficiency     Vitamin D deficiency        PAST SURGICAL HISTORY:   Past Surgical History:   Procedure Laterality Date    ENDOSCOPY, COLON, DIAGNOSTIC      HX COLONOSCOPY      HX ORTHOPAEDIC Left 2017    left hip    HX WISDOM TEETH EXTRACTION         ALLERGIES:   Allergies   Allergen Reactions    Other Food Rash and Itching     Black beans    Adhesive Rash and Itching    Cymbalta [Duloxetine] Other (comments)     Tachycardia, fever, H/A and HTN    Keflex [Cephalexin] Rash    Lisinopril Cough    Other Medication Other (comments)     Steroids - exacerbate her depression/anxiety    Pcn [Penicillins] Hives        CURRENT MEDICATIONS:  A list of medications prior to the time of admission include:  Prior to Admission medications    Medication Sig Start Date End Date Taking? Authorizing Provider   aspirin 81 mg chewable tablet Take 1 Tab by mouth two (2) times a day for 21 days. 2/13/20 3/5/20 Yes Goran Fong PA   polyethylene glycol (MIRALAX) 17 gram packet Take 17 g by mouth daily. Yes Provider, Historical   aspirin (ASPIRIN) 325 mg tablet Take 325 mg by mouth every six (6) hours as needed for Pain. Yes Provider, Historical   cholecalciferol (VITAMIN D3) (5000 Units/125 mcg) tab tablet Take 5,000 Units by mouth daily. Yes Provider, Historical   meloxicam (MOBIC) 15 mg tablet Take 15 mg by mouth daily. Yes Provider, Historical   estradioL (ESTRACE) 0.01 % (0.1 mg/gram) vaginal cream Insert 2 g into vagina daily. Yes Provider, Historical   losartan (COZAAR) 100 mg tablet Take 50 mg by mouth daily. Yes Provider, Historical   morphine CR (MS CONTIN) 100 mg CR tablet Take 100 mg by mouth every twelve (12) hours. [id number]    Yes Provider, Historical   methylphenidate (RITALIN) 20 mg tablet Take 20 mg by mouth four (4) times daily. Yes Provider, Historical   oxyCODONE IR (OXY-IR) 30 mg immediate release tablet Take 30 mg by mouth four (4) times daily. [id number]--[id number]   Yes Provider, Historical   famotidine (PEPCID) 40 mg tablet Take 40 mg by mouth daily as needed. Yes Provider, Historical   albuterol sulfate (PROVENTIL;VENTOLIN) 2.5 mg/0.5 mL nebu nebulizer solution 2.5 mg by Nebulization route every four (4) hours as needed for Wheezing.    Yes Provider, Historical   multivitamin (ONE A DAY) tablet Take 1 Tab by mouth two (2) times a day.   Yes Provider, Historical   ascorbic acid, vitamin C, (VITAMIN C) 500 mg tablet Take 1,000 mg by mouth two (2) times a day. Yes Provider, Historical   DULCOLAX, BISACODYL, PO Take 1 Cap by mouth daily as needed. 1-3 tablets   Yes Provider, Historical   acetaminophen (TYLENOL EXTRA STRENGTH) 500 mg tablet Take 1,000 mg by mouth every six (6) hours as needed for Pain. Yes Provider, Historical   atenolol (TENORMIN) 100 mg tablet Take 50 mg by mouth nightly. Yes Provider, Historical   ALPRAZolam (XANAX) 0.5 mg tablet Take 1 mg by mouth four (4) times daily as needed. To 1 mg    Yes Provider, Historical   cyanocobalamin (VITAMIN B12) 1,000 mcg/mL injection 10,000 mcg by IntraMUSCular route every thirty (30) days. Yes Provider, Historical   Calcium-Cholecalciferol, D3, (CALTRATE 600 PLUS D) 600 mg(1,500mg) -400 unit chew Take 1 Tab by mouth daily. Yes Provider, Historical   pseudoephedrine (SUDAFED) 30 mg tablet Take 60 mg by mouth every four (4) hours as needed. Yes Provider, Historical   albuterol (PROVENTIL HFA) 90 mcg/actuation inhaler Take 2 Puffs by inhalation every six (6) hours as needed. Yes Provider, Historical   fluticasone-salmeterol (ADVAIR DISKUS) 250-50 mcg/dose diskus inhaler Take 1 Puff by inhalation every twelve (12) hours. Yes Provider, Historical   valACYclovir (VALTREX) 500 mg tablet Take  by mouth two (2) times daily as needed. Yes Provider, Historical   NALOXONE HCL (NARCAN IJ) 0.4 mg by SubCUTAneous route as needed.     Provider, Historical       FAMILY HISTORY:   Family History   Problem Relation Age of Onset    Headache Mother     Asthma Mother     Hypertension Father     Headache Sister     Asthma Sister     Headache Maternal Aunt     Bipolar Disorder Maternal Aunt     Heart Attack Maternal Grandfather     Stroke Maternal Grandfather     Heart Attack Paternal Grandfather        SOCIAL HISTORY:   Social History     Socioeconomic History    Marital status:      Spouse name: Not on file    Number of children: Not on file    Years of education: Not on file    Highest education level: Not on file   Tobacco Use    Smoking status: Never Smoker    Smokeless tobacco: Never Used   Substance and Sexual Activity    Alcohol use: Not Currently    Drug use: No     Comment: prescription opiates    Sexual activity: Yes       REVIEW OF SYSTEMS: All review of systems are negative. PHYSICAL EXAMINATION: For a detailed physical exam, please refer to the patient's chart. HOSPITAL COURSE: The patient was taken to surgery the day of admission. she underwent right total hip replacement via the anterior approach. Operative course was benign. Estimated blood loss approximately 300 cc. The patient was taken to the PACU in stable condition and was later taken to the floor in stable condition. Post-op Day #1, patient has done very well.  she has had little to no pain. she had been cleared by physical therapy with stair training. she was placed on Aspirin for DVT prophylaxis. her vitals have remained stable. she has also remained hemodynamically stable. The patient has been recommended for discharge home. DISCHARGE INSTRUCTIONS: The patient is to be discharged home. she is to continue on her prior medications per the medication reconciliation form, to which we will add:           1)  Aspirin 81 mg; 1 tablet p.o. b.i.d. X 21 days    The patient is to continue at home with home physical therapy 3 times a week to work on gait training, range of motion, strengthening, and weightbearing exercises as tolerated on her right lower extremity. The patient is to progress from a walker to a cane to complete total weightbearing as tolerable. The patient is to continue to keep her incision dry.   The patient is to followup with Екатерина Phan PA-C, and/or [facility] DRISS in the office approximately 10-14 days status post for x-rays and further evaluation.       [place], [place]  02/14/20  7:06 AM

## 2020-02-13 NOTE — INTERVAL H&P NOTE
H&P Update: 
Genesis Albert was seen and examined. History and physical has been reviewed. The patient has been examined.  There have been no significant clinical changes since the completion of the originally dated History and Physical.

## 2020-02-13 NOTE — PERIOP NOTES
Pt. Used restroom in pre-op area with assistance. Patient placed on Babar Paws for a minimum of 30 min in  Preop.

## 2020-02-13 NOTE — ANESTHESIA PREPROCEDURE EVALUATION
Anesthetic History   No history of anesthetic complications     Pertinent negatives: No PONV       Review of Systems / Medical History  Patient summary reviewed, nursing notes reviewed and pertinent labs reviewed    Pulmonary            Asthma : well controlled  Pertinent negatives: No smoker     Neuro/Psych   Within defined limits           Cardiovascular    Hypertension: well controlled        Dysrhythmias : PVC and sinus tachycardia           GI/Hepatic/Renal     GERD: well controlled      Liver disease  Pertinent negatives: No renal disease   Endo/Other        Arthritis  Pertinent negatives: No diabetes   Other Findings   Comments: etoh 4/ week           Physical Exam    Airway  Mallampati: II  TM Distance: 4 - 6 cm  Neck ROM: normal range of motion   Mouth opening: Normal     Cardiovascular  Regular rate and rhythm,  S1 and S2 normal,  no murmur, click, rub, or gallop             Dental  No notable dental hx       Pulmonary  Breath sounds clear to auscultation               Abdominal  Abdominal exam normal       Other Findings            Anesthetic Plan    ASA: 3  Anesthesia type: spinal          Induction: Intravenous  Anesthetic plan and risks discussed with: Family and Patient      In choosing the spinal, there is another choice. The drug Dr. Jaron Perez prefers is called Mepivacaine. This drug has been used since 1956 for spinals and much more commonly for the past 15 years - especially at the [facility] an orthopedic hospital in [facility] which claims to be the [place] #1 in orthopedics for 10 straight years. However, it is not approved by the FDA for use in spinal. The bottle even states not for spinal use. It will never be approved as it is generic and there is nobody that is willing to put up the millions it would take to get it approved for this use. The alternative is Bupivacaine. It was approved > 35 years ago for spinal use while it was still a patented drug.  The problem with this drug is that it lasts so long that it may interfere with your physical therapy today.      Patient chooses Della Mcdonald

## 2020-02-13 NOTE — PERIOP NOTES
18 [facility] made aware that the SBAR is ready. Mike Gandhi RN will be accepting report.

## 2020-02-13 NOTE — ANESTHESIA POSTPROCEDURE EVALUATION
Post-Anesthesia Evaluation and Assessment    Cardiovascular Function/Vital Signs  Visit Vitals  /51   Pulse (!) 58   Temp 37 °C (98.6 °F)   Resp 17   Ht 5' 2\" (1.575 m)   Wt 69.5 kg (153 lb 3 oz)   SpO2 97%   BMI 28.02 kg/m²       Patient is status post Procedure(s):  RIGHT TOTAL HIP REPLACEMENT ANTERIOR APPROACH WITH C-ARM **SPEC POP**. Nausea/Vomiting: Controlled. Postoperative hydration reviewed and adequate. Pain:  Pain Scale 1: Numeric (0 - 10) (02/13/20 1600)  Pain Intensity 1: 0 (02/13/20 1600)   Managed. Neurological Status:   Neuro (WDL): Exceptions to WDL (02/13/20 1600)   At baseline. Mental Status and Level of Consciousness: Arousable. Pulmonary Status:   O2 Device: Nasal cannula (02/13/20 1600)   Adequate oxygenation and airway patent. Complications related to anesthesia: None    Post-anesthesia assessment completed. No concerns. Patient has met all discharge requirements.     Signed By: Michelle Almaguer CRNA    February 13, 2020

## 2020-02-13 NOTE — ROUTINE PROCESS
[address] TRANSFER - IN REPORT: 
 
Verbal report received from DESIRAE Andino RN(name) on Michael Carlisle  being received from [facility]) for routine post - op Report consisted of patients Situation, Background, Assessment and  
Recommendations(SBAR). Information from the following report(s) SBAR, Kardex and MAR was reviewed with the receiving nurse. Opportunity for questions and clarification was provided. Assessment completed upon patients arrival to unit and care assumed.

## 2020-02-13 NOTE — PERIOP NOTES
TRANSFER - OUT REPORT:    Verbal report given to Kaity Larsen (name) on [employer]  being transferred to 2 S (unit) for routine progression of care       Report consisted of patients Situation, Background, Assessment and   Recommendations(SBAR). Information from the following report(s) OR Summary, Procedure Summary, Intake/Output and MAR was reviewed with the receiving nurse. Lines:   Peripheral IV 02/13/20 Right; Inner Forearm (Active)   Site Assessment Clean, dry, & intact 2/13/2020  4:10 PM   Phlebitis Assessment 0 2/13/2020  4:10 PM   Infiltration Assessment 0 2/13/2020  4:10 PM   Dressing Status Clean, dry, & intact 2/13/2020  4:10 PM   Dressing Type Transparent;Tape 2/13/2020  4:10 PM   Hub Color/Line Status Infusing 2/13/2020  4:10 PM        Intake/Output Summary (Last 24 hours) at 2/13/2020 1828  Last data filed at 2/13/2020 1827  Gross per 24 hour   Intake 3550 ml   Output 1000 ml   Net 2550 ml       Opportunity for questions and clarification was provided.       Patient transported with:   O2 @ 2 liters  Registered Nurse

## 2020-02-13 NOTE — ANESTHESIA PROCEDURE NOTES
Spinal Block    Start time: 2/13/2020 2:14 PM  End time: 2/13/2020 12:26 PM  Performed by: Jasbir Sandoval CRNA  Authorized by: Julio Perea MD     Pre-procedure: Indications: primary anesthetic    Timeout Time: 14:14          Spinal Block:   Patient Position:  Seated  Prep Region:  Lumbar  Prep: chlorhexidine      Location:  L2-3  Technique:  Single shot    Local Dose (mL):  3.5    Needle:   Needle Type:   Shaneka    Attempts:  3      Events: CSF confirmed, no blood with aspiration and no paresthesia        Assessment:  Insertion:  Uncomplicated  Patient tolerance:  Patient tolerated the procedure well with no immediate complications  Mepivicaine 1.5% 3.5 ml per Dr Coleman Kirkpatrick

## 2020-02-14 VITALS
SYSTOLIC BLOOD PRESSURE: 140 MMHG | HEART RATE: 60 BPM | RESPIRATION RATE: 16 BRPM | HEIGHT: 62 IN | TEMPERATURE: 98.7 F | BODY MASS INDEX: 28.19 KG/M2 | DIASTOLIC BLOOD PRESSURE: 75 MMHG | WEIGHT: 153.19 LBS | OXYGEN SATURATION: 100 %

## 2020-02-14 LAB
ANION GAP SERPL CALC-SCNC: 3 MMOL/L (ref 3–18)
BASOPHILS # BLD: 0 K/UL (ref 0–0.1)
BASOPHILS NFR BLD: 0 % (ref 0–2)
BUN SERPL-MCNC: 14 MG/DL (ref 7–18)
BUN/CREAT SERPL: 21 (ref 12–20)
CALCIUM SERPL-MCNC: 9.1 MG/DL (ref 8.5–10.1)
CHLORIDE SERPL-SCNC: 110 MMOL/L (ref 100–111)
CO2 SERPL-SCNC: 30 MMOL/L (ref 21–32)
CREAT SERPL-MCNC: 0.66 MG/DL (ref 0.6–1.3)
DIFFERENTIAL METHOD BLD: ABNORMAL
EOSINOPHIL # BLD: 0 K/UL (ref 0–0.4)
EOSINOPHIL NFR BLD: 0 % (ref 0–5)
ERYTHROCYTE [DISTWIDTH] IN BLOOD BY AUTOMATED COUNT: 13.5 % (ref 11.6–14.5)
GLUCOSE SERPL-MCNC: 92 MG/DL (ref 74–99)
HCT VFR BLD AUTO: 34.7 % (ref 35–45)
HGB BLD-MCNC: 11.2 G/DL (ref 12–16)
LYMPHOCYTES # BLD: 2.2 K/UL (ref 0.9–3.6)
LYMPHOCYTES NFR BLD: 20 % (ref 21–52)
MCH RBC QN AUTO: 31.8 PG (ref 24–34)
MCHC RBC AUTO-ENTMCNC: 32.3 G/DL (ref 31–37)
MCV RBC AUTO: 98.6 FL (ref 74–97)
MONOCYTES # BLD: 0.8 K/UL (ref 0.05–1.2)
MONOCYTES NFR BLD: 7 % (ref 3–10)
NEUTS SEG # BLD: 8.1 K/UL (ref 1.8–8)
NEUTS SEG NFR BLD: 73 % (ref 40–73)
PLATELET # BLD AUTO: 191 K/UL (ref 135–420)
PMV BLD AUTO: 11.2 FL (ref 9.2–11.8)
POTASSIUM SERPL-SCNC: 4.9 MMOL/L (ref 3.5–5.5)
RBC # BLD AUTO: 3.52 M/UL (ref 4.2–5.3)
SODIUM SERPL-SCNC: 143 MMOL/L (ref 136–145)
WBC # BLD AUTO: 11.1 K/UL (ref 4.6–13.2)

## 2020-02-14 PROCEDURE — 97161 PT EVAL LOW COMPLEX 20 MIN: CPT

## 2020-02-14 PROCEDURE — 80048 BASIC METABOLIC PNL TOTAL CA: CPT

## 2020-02-14 PROCEDURE — 74011250637 HC RX REV CODE- 250/637: Performed by: PHYSICIAN ASSISTANT

## 2020-02-14 PROCEDURE — 97116 GAIT TRAINING THERAPY: CPT

## 2020-02-14 PROCEDURE — 36415 COLL VENOUS BLD VENIPUNCTURE: CPT

## 2020-02-14 PROCEDURE — 85025 COMPLETE CBC W/AUTO DIFF WBC: CPT

## 2020-02-14 PROCEDURE — 74011250636 HC RX REV CODE- 250/636: Performed by: PHYSICIAN ASSISTANT

## 2020-02-14 PROCEDURE — 65390000012 HC CONDITION CODE 44 OBSERVATION

## 2020-02-14 RX ADMIN — KETOROLAC TROMETHAMINE 15 MG: 30 INJECTION, SOLUTION INTRAMUSCULAR at 01:07

## 2020-02-14 RX ADMIN — MORPHINE SULFATE 100 MG: 100 TABLET, EXTENDED RELEASE ORAL at 07:01

## 2020-02-14 RX ADMIN — PANTOPRAZOLE SODIUM 40 MG: 40 TABLET, DELAYED RELEASE ORAL at 09:11

## 2020-02-14 RX ADMIN — FERROUS SULFATE TAB 325 MG (65 MG ELEMENTAL FE) 325 MG: 325 (65 FE) TAB at 09:11

## 2020-02-14 RX ADMIN — KETOROLAC TROMETHAMINE 15 MG: 30 INJECTION, SOLUTION INTRAMUSCULAR at 07:00

## 2020-02-14 RX ADMIN — OXYCODONE 30 MG: 5 TABLET ORAL at 01:07

## 2020-02-14 RX ADMIN — ACETAMINOPHEN 650 MG: 325 TABLET ORAL at 07:01

## 2020-02-14 RX ADMIN — ACETAMINOPHEN 650 MG: 325 TABLET ORAL at 01:07

## 2020-02-14 RX ADMIN — DOCUSATE SODIUM 100 MG: 100 CAPSULE, LIQUID FILLED ORAL at 09:11

## 2020-02-14 RX ADMIN — Medication 10 ML: at 07:01

## 2020-02-14 RX ADMIN — LOSARTAN POTASSIUM 50 MG: 50 TABLET, FILM COATED ORAL at 09:11

## 2020-02-14 RX ADMIN — OXYCODONE 30 MG: 5 TABLET ORAL at 07:01

## 2020-02-14 RX ADMIN — ASPIRIN 81 MG: 81 TABLET, COATED ORAL at 09:11

## 2020-02-14 RX ADMIN — CEFAZOLIN SODIUM 2 G: 2 SOLUTION INTRAVENOUS at 07:01

## 2020-02-14 NOTE — ROUTINE PROCESS
Bedside and Verbal shift change report given to DARA Moore RN  by Ab Hernández RN. Report included the following information SBAR, Kardex, OR Summary, Intake/Output and MAR.

## 2020-02-14 NOTE — PROGRESS NOTES
Assumed care of pt at this time. Assessment complete. Pt alert and oriented x 4. Denies SOB and chest pain. Pt lungs clear bilaterally. Cap refill  less than 3 seconds. Pt denies numbness and tingling to all extremities. Stated pain 3/10. Pt has 18G IV to R inner forearm. Pt has mepilex dressing to R hip noted CDI . SCD's and TEDs applied to bilaterally. Pt encouraged to continue use of IS. Pt verbalized understanding. Ice pack applied. Call light and possessions within reach. Bed in low position. Will continue to monitor.  Fall risk arm band in place    0905- Pt ambulated to bathroom      0916-Pt ambulating in hallway with PT

## 2020-02-14 NOTE — ROUTINE PROCESS
1945 
Bedside and Verbal shift change report given to Justus Chacon RN (oncoming nurse) by Flor Cooper RN (offgoing nurse). Report included the following information SBAR, Kardex and MAR.

## 2020-02-14 NOTE — PROGRESS NOTES
1944 - Assumed care at this time. Pt resting quietly in bed. No signs of distress. Will continue to monitor. Pt encouraged to call for assistance. 2130 - Patient ambulated OOB to [facility] with steady gait and took side steps at the bedside at this time. Patient A&Ox4, RA. Denies chest pain and SOB. 18G IV to right FA  intact and patent. SCD compression device and TEDs bilaterally. Silver Mepilex dressing to right hip CDI. Denies numbness/tingling/calf pain. Pain 7/10 with a tolerable level of 4/10. Pt requested pain medication, educated pt that due to home meds of Oxycodone IR 30mg q6h and MS Contin 100mg q12h there are no PRN medications available, Toradol scheduled for additional pain relief. Pt verbalized understanding. Pt educated on IS use, q2h rounds, pain management, CHG wipes and \"Up for Meals\". Pt verbalized understanding, no concerns voiced. Call bell within reach, bed in lowest position. Pt encouraged to call for assistance. 2319 - Patient ambulated OOB to [facility] with steady gait. No concerns voiced. Call bell within reach, bed in lowest position. Pt encouraged to call for assistance. 8312 - Patient ambulated OOB to  with steady gait. CHG wipes completed. No concerns voiced. Call bell within reach, bed in lowest position. Pt encouraged to call for assistance.

## 2020-02-14 NOTE — PROGRESS NOTES
Transition of Care (SAGE) Plan:    Chart reviewed, met with pt in room. Pt planning discharge home, states she has assistance at home. FOC offered, pt chose [facility]   for follow up; referral placed with CMS. Pt has RW for home. Virtual reviewer communicated change to CM which reflect outpatient observation order written prior to discharge order being written. Code 44 delivered to patient. CM met with the patient and provided to the patient the printed information about her outpatient observation status. The patient was given the flyer entitled, \"Medicare Outpatient Observation: Information & notification. \" All questions were answered, no additional discharge needs identified at this time and patient expects to return to their (home, assisted living facility, relatives home, etc.) after discharge today. [facility] Transportation:   How is patient being transported at discharge? Family/Friend      When? Once cleared by Therapy between 12-2pm     Is transport scheduled? N/A      Follow-up appointment and transportation:   PCP/Specialist?  See AVS for Appointment         Who is transporting to the follow-up appointment? Family/Friend      Is transport for follow up appointment scheduled? N/A    Communication plan (with patient/family): Who is being called? Patient or Next of Kin? Responsible party? Patient      What number(s) is to be used? See Facesheet      What service provider is calling for [address] services? When are they calling? 24-48 hours following discharge    Readmission Risk? (Green/Low; Yellow/Moderate; Red/High):  Green    Care Management Interventions  PCP Verified by CM:  Yes  Transition of Care Consult (CM Consult): [address]: No  Reason Outside [place]: Out of service area([facility])  Discharge Durable Medical Equipment: No  Physical Therapy Consult: Yes  Occupational Therapy Consult: Yes  Current Support Network: Lives with Spouse  Confirm Follow Up Transport: Family  The Plan for Transition of Care is Related to the Following Treatment Goals : HH  The Patient and/or Patient Representative was Provided with a Choice of Provider and Agrees with the Discharge Plan?: Yes  Freedom of Choice List was Provided with Basic Dialogue that Supports the Patient's Individualized Plan of Care/Goals, Treatment Preferences and Shares the Quality Data Associated with the Providers?: Yes  Discharge Location  Discharge Placement: Home with home health

## 2020-02-14 NOTE — ROUTINE PROCESS
Dual AVS reviewed with Elsy Sue. All medications reviewed individually with patient. Opportunities for questions and concerns provided. Patient to be discharged via (mode of transport ie. Car, ambulance or air transport) car. Patient's arm band appropriately discarded.

## 2020-02-14 NOTE — PROGRESS NOTES
Problem: Mobility Impaired (Adult and Pediatric)  Goal: *Acute Goals and Plan of Care (Insert Text)  Description  Goals to be addressed in 1-4 days:  STG  1. Rolling L to R to L modified independent for positioning. 2. Supine to sit to supine S with HR for meals. 3. Sit to stand to sit S with RW in prep for ambulation. LTG  1. Ambulate >150ft S with RW, WBAT, for home/community mobility. 2. Ascend/descend a >5 stair steps CGA with HR for home entry. 3. Tolerate up in chair 1-2 hours for ADLs. 4. Patient/family to be independent with HEP for follow-up care and safe discharge. Note:   PHYSICAL THERAPY EVALUATION & DISCHARGE    Patient: Herchel Dandy (50 y.o. female)  Date: 2/14/2020  Primary Diagnosis: Primary localized osteoarthritis of right hip [M16.11]  Procedure(s) (LRB):  RIGHT TOTAL HIP REPLACEMENT ANTERIOR APPROACH WITH C-ARM **SPEC POP** (Right) 1 Day Post-Op   Precautions:   Fall, WBAT    ASSESSMENT AND RECOMMENDATIONS:  Based on the objective data described below, the patient presents with lower extremity weakness, decreased gait quality and endurance, impaired bed mobility and transfers, decreased R hip ROM/flexibility, and overall limitations in functional mobility s/p R THR AA. Pt performed supine to sit with supervision, sit to stand with supervision. Patient ambulated 150 feet with RW, GB applied, supervision. Pt negotiated 5 stairs with B h/r. Pt tolerated session well as evidenced by no c/o increased pain, no lightheadedness or dizziness. Patient has met short and long term goals at this time. Pt has been educated on seated and supine therex to be performed 3x/day at home to improve strength and ROM. Pt educated on icing, positioning, home safety, and activity pacing.  Pt has met inpatient physical therapy goals at this time and home health physical therapy is recommended upon discharge from hospital.   Discharge Recommendations: Home Health  Further Equipment Recommendations for Discharge: N/A      SUBJECTIVE:   Patient stated I am doing ok, I just want to sleep.     OBJECTIVE DATA SUMMARY:     Past Medical History:   Diagnosis Date    Anxiety disorder     Arthritis     Asthma     Attention deficit disorder (ADD)     Chronic pain     lumbar and cervical spine and joints    DDD (degenerative disc disease), cervical     DDD (degenerative disc disease), lumbar     Depression     major depression and anxiety, PTSD    Fatty liver disease, nonalcoholic     Fibromyalgia     GERD (gastroesophageal reflux disease)     Headache(784.0)     Hypertension     Ill-defined condition     Migraine headache     Neuralgia     secondary to shingles    Osteoarthritis of left hip 8/9/2017    Positive NICOLE (antinuclear antibody)     Post-menopausal     Premature ventricular contractions     occassional    Primary localized osteoarthritis of right hip 2/6/2020    PTSD (post-traumatic stress disorder)     Vitamin B12 deficiency     Vitamin D deficiency      Past Surgical History:   Procedure Laterality Date    ENDOSCOPY, COLON, DIAGNOSTIC      HX COLONOSCOPY      HX ORTHOPAEDIC Left 2017    left hip    HX WISDOM TEETH EXTRACTION       Barriers to Learning/Limitations: None  Compensate with: visual, verbal, tactile, kinesthetic cues/model  Prior Level of Function/Home Situation: Independent amb s/AD  Home Situation  Home Environment: Private residence  # Steps to Enter: 5  Rails to Enter: Yes  Hand Rails : Bilateral  One/Two Story Residence: Two story, live on 1st floor  Lift Chair Available: No  Living Alone: No  Support Systems: Spouse/Significant Other/Partner  Patient Expects to be Discharged to[de-identified] Private residence  Current DME Used/Available at Home: Walker, rolling  Critical Behavior:  Neurologic State: Alert; Appropriate for age  Orientation Level: Appropriate for age;Oriented X4  Cognition: Appropriate decision making; Appropriate for age attention/concentration; Appropriate safety awareness; Follows commands Psychosocial  Purposeful Interaction: Yes  Pt Identified Daily Priority: Clinical issues (comment)  Caritas Process: Nurture loving kindness;Establish trust;Teaching/learning; Attend basic human needs;Create healing environment  Caring Interventions: Reassure; Therapeutic modalities  Reassure: Therapeutic listening; Informing  Therapeutic Modalities: Intentional therapeutic touch;Humor  Skin Condition/Temp: Warm;Dry   Skin Integrity: Incision (comment)(R hip)  Skin Integumentary  Skin Color: Appropriate for ethnicity  Skin Condition/Temp: Warm;Dry  Skin Integrity: Incision (comment)(R hip)  Turgor: Non-tenting  Hair Growth: Present  Varicosities: Absent   Strength:    Strength: Generally decreased, functional  Tone & Sensation:   Tone: Normal  Sensation: Impaired  Range Of Motion:  AROM: Generally decreased, functional  PROM: Generally decreased, functional  Functional Mobility:  Bed Mobility:  Supine to Sit: Supervision  Sit to Supine: Supervision  Transfers:  Sit to Stand: Supervision  Stand to Sit: Supervision  Balance:   Sitting: Intact  Standing: Intact; With support  Ambulation/Gait Training:  Distance (ft): 150 Feet (ft)  Assistive Device: Gait belt;Walker, rolling  Ambulation - Level of Assistance: Supervision   Gait Description (WDL): Exceptions to WDL  Gait Abnormalities: Antalgic;Decreased step clearance  Right Side Weight Bearing: As tolerated   Base of Support: Shift to left  Stance: Right decreased  Speed/Vika: Slow  Step Length: Left shortened;Right shortened  Pain:  Pain Scale 1: Numeric (0 - 10)  Pain Intensity 1: 3  Activity Tolerance:   Good  Please refer to the flowsheet for vital signs taken during this treatment.   After treatment:   []         Patient left in no apparent distress sitting up in chair  [x]         Patient left in no apparent distress in bed  [x]         Call bell left within reach  [x]         Nursing notified  []         Caregiver present  []         Bed alarm activated    COMMUNICATION/EDUCATION:   [x]         Fall prevention education was provided and the patient/caregiver indicated understanding. [x]         Patient/family have participated as able in goal setting and plan of care. [x]         Patient/family agree to work toward stated goals and plan of care. []         Patient understands intent and goals of therapy, but is neutral about his/her participation. []         Patient is unable to participate in goal setting and plan of care.     Thank you for this referral.  Les Dc   Time Calculation: 23 mins   Eval Complexity: History: MEDIUM  Complexity : 1-2 comorbidities / personal factors will impact the outcome/ POC Exam:LOW Complexity : 1-2 Standardized tests and measures addressing body structure, function, activity limitation and / or participation in recreation  Presentation: LOW Complexity : Stable, uncomplicated  Clinical Decision Making:Low Complexity    Overall Complexity:LOW

## (undated) DEVICE — NEEDLE SPNL 20GA L3.5IN YEL HUB S STL REG WALL FIT STYL W/

## (undated) DEVICE — STRYKER PERFORMANCE SERIES SAGITTAL BLADE: Brand: STRYKER PERFORMANCE SERIES

## (undated) DEVICE — GOWN,SIRUS,NONRNF,SETINSLV,XL,20/CS: Brand: MEDLINE

## (undated) DEVICE — SUT VCRL + 2-0 36IN CT1 UD --

## (undated) DEVICE — SOL INJ L R 1000ML BG --

## (undated) DEVICE — (D)PREP SKN CHLRAPRP APPL 26ML -- CONVERT TO ITEM 371833

## (undated) DEVICE — SYR 3ML LL TIP 1/10ML GRAD --

## (undated) DEVICE — STERILE POLYISOPRENE POWDER-FREE SURGICAL GLOVES WITH EMOLLIENT COATING: Brand: PROTEXIS

## (undated) DEVICE — KENDALL SCD EXPRESS SLEEVES, KNEE LENGTH, MEDIUM: Brand: KENDALL SCD

## (undated) DEVICE — SUT VCRL + 1 36IN CT1 VIO --

## (undated) DEVICE — STERILE POLYISOPRENE POWDER-FREE SURGICAL GLOVES: Brand: PROTEXIS

## (undated) DEVICE — PACK PROCEDURE SURG TOT HIP ANTR CARTER CUST

## (undated) DEVICE — SOL IRRIGATION INJ NACL 0.9% 500ML BTL

## (undated) DEVICE — THE CANADY HYBRID PLASMA SCALPEL IS AN ELECTROSURGICAL PLASMA SCALPEL THAT USES AN 85MM BENDABLE PADDLE BLADE TIP. THE ELECTROSURGICAL PLASMA SCALPEL IS USED TO SIMULTANEOUSLY CUT AND COAGULATE BIOLOGICAL TISSUE.: Brand: CANADY HYBRID PLASMA PADDLE BLADE

## (undated) DEVICE — DRSG MEPILEX BORDER AG 4X8 -- 5/BX

## (undated) DEVICE — SOLUTION SCRB 4OZ 10% PVP I POVIDONE IOD TOP PAINT EXIDINE

## (undated) DEVICE — HANDPIECE SET WITH HIGH FLOW TIP AND SUCTION TUBE: Brand: INTERPULSE

## (undated) DEVICE — NDL PRT INJ NSAF BLNT 18GX1.5 --

## (undated) DEVICE — REM POLYHESIVE ADULT PATIENT RETURN ELECTRODE: Brand: VALLEYLAB

## (undated) DEVICE — GARMENT,MEDLINE,DVT,INT,CALF,MED, GEN2: Brand: MEDLINE

## (undated) DEVICE — TRAY PHAR SYR 30ML PLAS LUERLOCK TIP N CTRL CONVENIENCE

## (undated) DEVICE — SYSTEM SKIN CLSR 22CM DERMBND PRINEO

## (undated) DEVICE — Device

## (undated) DEVICE — SUTURE STRATAFIX SPRL MCRYL + SZ 3-0 L18IN ABSRB UD PS-2 SXMP1B107

## (undated) DEVICE — BLADE SAW 1.27X13X90 MM FOR LG BNE

## (undated) DEVICE — ZIP 16 SURGICAL SKIN CLOSURE DEVICE: Brand: ZIP 16 SURGICAL SKIN CLOSURE DEVICE

## (undated) DEVICE — SINGLE PORT MANIFOLD: Brand: NEPTUNE 2

## (undated) DEVICE — PLUS HANDPIECE WITH SPRAY TIP: Brand: SURGILAV